# Patient Record
Sex: FEMALE | Race: WHITE | NOT HISPANIC OR LATINO | Employment: OTHER | URBAN - METROPOLITAN AREA
[De-identification: names, ages, dates, MRNs, and addresses within clinical notes are randomized per-mention and may not be internally consistent; named-entity substitution may affect disease eponyms.]

---

## 2017-04-21 ENCOUNTER — RECORDS - HEALTHEAST (OUTPATIENT)
Dept: LAB | Facility: CLINIC | Age: 67
End: 2017-04-21

## 2017-04-21 LAB
AST SERPL W P-5'-P-CCNC: 20 U/L (ref 0–40)
CHOLEST SERPL-MCNC: 195 MG/DL
FASTING STATUS PATIENT QL REPORTED: YES
HDLC SERPL-MCNC: 45 MG/DL
LDLC SERPL CALC-MCNC: 124 MG/DL
TRIGL SERPL-MCNC: 128 MG/DL

## 2017-10-20 ENCOUNTER — HOSPITAL ENCOUNTER (OUTPATIENT)
Dept: MAMMOGRAPHY | Facility: CLINIC | Age: 67
Discharge: HOME | End: 2017-10-20
Attending: FAMILY MEDICINE

## 2017-10-20 DIAGNOSIS — Z12.31 VISIT FOR SCREENING MAMMOGRAM: ICD-10-CM

## 2017-12-18 ENCOUNTER — RECORDS - HEALTHEAST (OUTPATIENT)
Dept: ADMINISTRATIVE | Facility: OTHER | Age: 67
End: 2017-12-18

## 2017-12-22 ENCOUNTER — HOSPITAL ENCOUNTER (OUTPATIENT)
Dept: ULTRASOUND IMAGING | Facility: CLINIC | Age: 67
Discharge: HOME | End: 2017-12-22
Attending: PHYSICIAN ASSISTANT

## 2017-12-22 ENCOUNTER — COMMUNICATION - HEALTHEAST (OUTPATIENT)
Dept: TELEHEALTH | Facility: CLINIC | Age: 67
End: 2017-12-22

## 2017-12-22 ENCOUNTER — HOSPITAL ENCOUNTER (OUTPATIENT)
Dept: MAMMOGRAPHY | Facility: CLINIC | Age: 67
Discharge: HOME | End: 2017-12-22
Attending: PHYSICIAN ASSISTANT

## 2017-12-22 DIAGNOSIS — N63.21 BREAST LUMP ON LEFT SIDE AT 1 O'CLOCK POSITION: ICD-10-CM

## 2017-12-22 DIAGNOSIS — N63.20 LEFT BREAST MASS: ICD-10-CM

## 2017-12-27 LAB
LAB AP CHARGES (HE HISTORICAL CONVERSION): ABNORMAL
LAB MED GENERAL PATH INTERP (HE HISTORICAL CONVERSION): ABNORMAL
PATH REPORT.COMMENTS IMP SPEC: ABNORMAL
PATH REPORT.COMMENTS IMP SPEC: ABNORMAL
PATH REPORT.FINAL DX SPEC: ABNORMAL
PATH REPORT.MICROSCOPIC SPEC OTHER STN: ABNORMAL
PATH REPORT.RELEVANT HX SPEC: ABNORMAL
RESULT FLAG (HE HISTORICAL CONVERSION): ABNORMAL
SPECIMEN DESCRIPTION: ABNORMAL

## 2017-12-28 LAB
LAB AP CHARGES (HE HISTORICAL CONVERSION): NORMAL
PATH REPORT.COMMENTS IMP SPEC: NORMAL
PATH REPORT.COMMENTS IMP SPEC: NORMAL
PATH REPORT.FINAL DX SPEC: NORMAL
PATH REPORT.GROSS SPEC: NORMAL
PATH REPORT.MICROSCOPIC SPEC OTHER STN: NORMAL
PATH REPORT.RELEVANT HX SPEC: NORMAL
RESULT FLAG (HE HISTORICAL CONVERSION): NORMAL

## 2018-01-04 ENCOUNTER — OFFICE VISIT - HEALTHEAST (OUTPATIENT)
Dept: SURGERY | Facility: CLINIC | Age: 68
End: 2018-01-04

## 2018-01-04 DIAGNOSIS — N63.0 BREAST MASS: ICD-10-CM

## 2018-01-04 RX ORDER — SIMVASTATIN 10 MG
1 TABLET ORAL DAILY
Refills: 3 | Status: SHIPPED | COMMUNITY
Start: 2017-12-04

## 2018-01-04 ASSESSMENT — MIFFLIN-ST. JEOR: SCORE: 1095.14

## 2018-01-25 ASSESSMENT — MIFFLIN-ST. JEOR: SCORE: 1095.14

## 2018-01-26 ENCOUNTER — SURGERY - HEALTHEAST (OUTPATIENT)
Dept: SURGERY | Facility: AMBULATORY SURGERY CENTER | Age: 68
End: 2018-01-26

## 2018-01-26 ENCOUNTER — ANESTHESIA - HEALTHEAST (OUTPATIENT)
Dept: SURGERY | Facility: AMBULATORY SURGERY CENTER | Age: 68
End: 2018-01-26

## 2018-01-26 ASSESSMENT — MIFFLIN-ST. JEOR: SCORE: 1095.14

## 2018-01-31 ENCOUNTER — COMMUNICATION - HEALTHEAST (OUTPATIENT)
Dept: SURGERY | Facility: CLINIC | Age: 68
End: 2018-01-31

## 2018-02-02 ENCOUNTER — COMMUNICATION - HEALTHEAST (OUTPATIENT)
Dept: ONCOLOGY | Facility: CLINIC | Age: 68
End: 2018-02-02

## 2018-02-08 ENCOUNTER — AMBULATORY - HEALTHEAST (OUTPATIENT)
Dept: SURGERY | Facility: CLINIC | Age: 68
End: 2018-02-08

## 2018-02-08 DIAGNOSIS — R92.8 ABNORMAL MAMMOGRAM: ICD-10-CM

## 2018-02-09 ENCOUNTER — OFFICE VISIT - HEALTHEAST (OUTPATIENT)
Dept: SURGERY | Facility: CLINIC | Age: 68
End: 2018-02-09

## 2018-02-09 DIAGNOSIS — Z17.0 MALIGNANT NEOPLASM OF LOWER-INNER QUADRANT OF LEFT BREAST IN FEMALE, ESTROGEN RECEPTOR POSITIVE (H): ICD-10-CM

## 2018-02-09 DIAGNOSIS — C50.312 MALIGNANT NEOPLASM OF LOWER-INNER QUADRANT OF LEFT BREAST IN FEMALE, ESTROGEN RECEPTOR POSITIVE (H): ICD-10-CM

## 2018-02-09 ASSESSMENT — MIFFLIN-ST. JEOR: SCORE: 1090.49

## 2018-02-19 ENCOUNTER — ANESTHESIA - HEALTHEAST (OUTPATIENT)
Dept: SURGERY | Facility: AMBULATORY SURGERY CENTER | Age: 68
End: 2018-02-19

## 2018-02-21 ENCOUNTER — HOSPITAL ENCOUNTER (OUTPATIENT)
Dept: MAMMOGRAPHY | Facility: HOSPITAL | Age: 68
Discharge: HOME | End: 2018-02-21
Attending: SPECIALIST

## 2018-02-21 ENCOUNTER — HOSPITAL ENCOUNTER (OUTPATIENT)
Dept: NUCLEAR MEDICINE | Facility: HOSPITAL | Age: 68
Discharge: HOME | End: 2018-02-21
Attending: SPECIALIST | Admitting: RADIOLOGY

## 2018-02-21 ENCOUNTER — SURGERY - HEALTHEAST (OUTPATIENT)
Dept: SURGERY | Facility: AMBULATORY SURGERY CENTER | Age: 68
End: 2018-02-21

## 2018-02-21 DIAGNOSIS — Z17.0 MALIGNANT NEOPLASM OF LOWER-INNER QUADRANT OF LEFT BREAST IN FEMALE, ESTROGEN RECEPTOR POSITIVE (H): ICD-10-CM

## 2018-02-21 DIAGNOSIS — C50.312 MALIGNANT NEOPLASM OF LOWER-INNER QUADRANT OF LEFT BREAST IN FEMALE, ESTROGEN RECEPTOR POSITIVE (H): ICD-10-CM

## 2018-02-26 ENCOUNTER — AMBULATORY - HEALTHEAST (OUTPATIENT)
Dept: SURGERY | Facility: CLINIC | Age: 68
End: 2018-02-26

## 2018-03-01 ENCOUNTER — COMMUNICATION - HEALTHEAST (OUTPATIENT)
Dept: ONCOLOGY | Facility: CLINIC | Age: 68
End: 2018-03-01

## 2018-03-01 ENCOUNTER — OFFICE VISIT - HEALTHEAST (OUTPATIENT)
Dept: SURGERY | Facility: CLINIC | Age: 68
End: 2018-03-01

## 2018-03-01 DIAGNOSIS — Z48.89 POSTOPERATIVE VISIT: ICD-10-CM

## 2018-03-01 DIAGNOSIS — Z17.0 MALIGNANT NEOPLASM OF LOWER-INNER QUADRANT OF LEFT BREAST IN FEMALE, ESTROGEN RECEPTOR POSITIVE (H): ICD-10-CM

## 2018-03-01 DIAGNOSIS — C50.312 MALIGNANT NEOPLASM OF LOWER-INNER QUADRANT OF LEFT BREAST IN FEMALE, ESTROGEN RECEPTOR POSITIVE (H): ICD-10-CM

## 2018-03-02 ENCOUNTER — COMMUNICATION - HEALTHEAST (OUTPATIENT)
Dept: ONCOLOGY | Facility: CLINIC | Age: 68
End: 2018-03-02

## 2018-03-16 ENCOUNTER — OFFICE VISIT - HEALTHEAST (OUTPATIENT)
Dept: SURGERY | Facility: CLINIC | Age: 68
End: 2018-03-16

## 2018-03-16 DIAGNOSIS — Z48.89 POSTOPERATIVE VISIT: ICD-10-CM

## 2018-03-20 ENCOUNTER — COMMUNICATION - HEALTHEAST (OUTPATIENT)
Dept: ONCOLOGY | Facility: CLINIC | Age: 68
End: 2018-03-20

## 2018-03-21 ENCOUNTER — RECORDS - HEALTHEAST (OUTPATIENT)
Dept: ADMINISTRATIVE | Facility: OTHER | Age: 68
End: 2018-03-21

## 2018-03-23 ENCOUNTER — AMBULATORY - HEALTHEAST (OUTPATIENT)
Dept: RADIATION ONCOLOGY | Facility: HOSPITAL | Age: 68
End: 2018-03-23

## 2018-03-23 ENCOUNTER — OFFICE VISIT - HEALTHEAST (OUTPATIENT)
Dept: RADIATION ONCOLOGY | Facility: HOSPITAL | Age: 68
End: 2018-03-23

## 2018-03-23 DIAGNOSIS — Z17.0 MALIGNANT NEOPLASM OF LOWER-INNER QUADRANT OF LEFT BREAST IN FEMALE, ESTROGEN RECEPTOR POSITIVE (H): ICD-10-CM

## 2018-03-23 DIAGNOSIS — C50.312 MALIGNANT NEOPLASM OF LOWER-INNER QUADRANT OF LEFT BREAST IN FEMALE, ESTROGEN RECEPTOR POSITIVE (H): ICD-10-CM

## 2018-03-23 ASSESSMENT — MIFFLIN-ST. JEOR: SCORE: 1096.84

## 2018-03-28 ENCOUNTER — RECORDS - HEALTHEAST (OUTPATIENT)
Dept: ADMINISTRATIVE | Facility: OTHER | Age: 68
End: 2018-03-28

## 2018-04-04 ENCOUNTER — OFFICE VISIT - HEALTHEAST (OUTPATIENT)
Dept: RADIATION ONCOLOGY | Facility: CLINIC | Age: 68
End: 2018-04-04

## 2018-04-04 DIAGNOSIS — C50.312 MALIGNANT NEOPLASM OF LOWER-INNER QUADRANT OF LEFT BREAST IN FEMALE, ESTROGEN RECEPTOR POSITIVE (H): ICD-10-CM

## 2018-04-04 DIAGNOSIS — Z17.0 MALIGNANT NEOPLASM OF LOWER-INNER QUADRANT OF LEFT BREAST IN FEMALE, ESTROGEN RECEPTOR POSITIVE (H): ICD-10-CM

## 2018-04-11 ENCOUNTER — OFFICE VISIT - HEALTHEAST (OUTPATIENT)
Dept: RADIATION ONCOLOGY | Facility: CLINIC | Age: 68
End: 2018-04-11

## 2018-04-11 DIAGNOSIS — Z17.0 MALIGNANT NEOPLASM OF LOWER-INNER QUADRANT OF LEFT BREAST IN FEMALE, ESTROGEN RECEPTOR POSITIVE (H): ICD-10-CM

## 2018-04-11 DIAGNOSIS — C50.312 MALIGNANT NEOPLASM OF LOWER-INNER QUADRANT OF LEFT BREAST IN FEMALE, ESTROGEN RECEPTOR POSITIVE (H): ICD-10-CM

## 2018-04-11 ASSESSMENT — MIFFLIN-ST. JEOR: SCORE: 1103.19

## 2018-04-18 ENCOUNTER — AMBULATORY - HEALTHEAST (OUTPATIENT)
Dept: ONCOLOGY | Facility: CLINIC | Age: 68
End: 2018-04-18

## 2018-04-18 ENCOUNTER — OFFICE VISIT - HEALTHEAST (OUTPATIENT)
Dept: RADIATION ONCOLOGY | Facility: CLINIC | Age: 68
End: 2018-04-18

## 2018-04-18 DIAGNOSIS — C50.312 MALIGNANT NEOPLASM OF LOWER-INNER QUADRANT OF LEFT BREAST IN FEMALE, ESTROGEN RECEPTOR POSITIVE (H): ICD-10-CM

## 2018-04-18 DIAGNOSIS — Z17.0 MALIGNANT NEOPLASM OF LOWER-INNER QUADRANT OF LEFT BREAST IN FEMALE, ESTROGEN RECEPTOR POSITIVE (H): ICD-10-CM

## 2018-04-23 ENCOUNTER — RECORDS - HEALTHEAST (OUTPATIENT)
Dept: LAB | Facility: CLINIC | Age: 68
End: 2018-04-23

## 2018-04-23 LAB
ALBUMIN SERPL-MCNC: 3.9 G/DL (ref 3.5–5)
ALP SERPL-CCNC: 105 U/L (ref 45–120)
ALT SERPL W P-5'-P-CCNC: 24 U/L (ref 0–45)
ANION GAP SERPL CALCULATED.3IONS-SCNC: 12 MMOL/L (ref 5–18)
AST SERPL W P-5'-P-CCNC: 18 U/L (ref 0–40)
BILIRUB SERPL-MCNC: 1.6 MG/DL (ref 0–1)
BUN SERPL-MCNC: 15 MG/DL (ref 8–22)
CALCIUM SERPL-MCNC: 9.3 MG/DL (ref 8.5–10.5)
CHLORIDE BLD-SCNC: 106 MMOL/L (ref 98–107)
CHOLEST SERPL-MCNC: 172 MG/DL
CO2 SERPL-SCNC: 23 MMOL/L (ref 22–31)
CREAT SERPL-MCNC: 0.71 MG/DL (ref 0.6–1.1)
FASTING STATUS PATIENT QL REPORTED: ABNORMAL
GFR SERPL CREATININE-BSD FRML MDRD: >60 ML/MIN/1.73M2
GLUCOSE BLD-MCNC: 107 MG/DL (ref 70–125)
HDLC SERPL-MCNC: 49 MG/DL
LDLC SERPL CALC-MCNC: 91 MG/DL
POTASSIUM BLD-SCNC: 3.8 MMOL/L (ref 3.5–5)
PROT SERPL-MCNC: 7.2 G/DL (ref 6–8)
SODIUM SERPL-SCNC: 141 MMOL/L (ref 136–145)
TRIGL SERPL-MCNC: 159 MG/DL

## 2018-04-25 ENCOUNTER — AMBULATORY - HEALTHEAST (OUTPATIENT)
Dept: ONCOLOGY | Facility: CLINIC | Age: 68
End: 2018-04-25

## 2018-04-25 ENCOUNTER — OFFICE VISIT - HEALTHEAST (OUTPATIENT)
Dept: RADIATION ONCOLOGY | Facility: CLINIC | Age: 68
End: 2018-04-25

## 2018-04-25 DIAGNOSIS — Z17.0 MALIGNANT NEOPLASM OF LOWER-INNER QUADRANT OF LEFT BREAST IN FEMALE, ESTROGEN RECEPTOR POSITIVE (H): ICD-10-CM

## 2018-04-25 DIAGNOSIS — C50.312 MALIGNANT NEOPLASM OF LOWER-INNER QUADRANT OF LEFT BREAST IN FEMALE, ESTROGEN RECEPTOR POSITIVE (H): ICD-10-CM

## 2018-05-08 ENCOUNTER — COMMUNICATION - HEALTHEAST (OUTPATIENT)
Dept: RADIATION ONCOLOGY | Facility: CLINIC | Age: 68
End: 2018-05-08

## 2018-05-09 ENCOUNTER — AMBULATORY - HEALTHEAST (OUTPATIENT)
Dept: RADIATION ONCOLOGY | Facility: CLINIC | Age: 68
End: 2018-05-09

## 2018-06-11 ENCOUNTER — OFFICE VISIT - HEALTHEAST (OUTPATIENT)
Dept: RADIATION ONCOLOGY | Facility: HOSPITAL | Age: 68
End: 2018-06-11

## 2018-06-11 DIAGNOSIS — C50.312 MALIGNANT NEOPLASM OF LOWER-INNER QUADRANT OF LEFT BREAST IN FEMALE, ESTROGEN RECEPTOR POSITIVE (H): ICD-10-CM

## 2018-06-11 DIAGNOSIS — Z17.0 MALIGNANT NEOPLASM OF LOWER-INNER QUADRANT OF LEFT BREAST IN FEMALE, ESTROGEN RECEPTOR POSITIVE (H): ICD-10-CM

## 2018-06-18 ENCOUNTER — COMMUNICATION - HEALTHEAST (OUTPATIENT)
Dept: ONCOLOGY | Facility: HOSPITAL | Age: 68
End: 2018-06-18

## 2018-06-20 ENCOUNTER — COMMUNICATION - HEALTHEAST (OUTPATIENT)
Dept: ONCOLOGY | Facility: CLINIC | Age: 68
End: 2018-06-20

## 2018-07-23 ENCOUNTER — RECORDS - HEALTHEAST (OUTPATIENT)
Dept: LAB | Facility: CLINIC | Age: 68
End: 2018-07-23

## 2018-07-23 LAB
CHOLEST SERPL-MCNC: 163 MG/DL
FASTING STATUS PATIENT QL REPORTED: YES
HDLC SERPL-MCNC: 50 MG/DL
LDLC SERPL CALC-MCNC: 94 MG/DL
TRIGL SERPL-MCNC: 97 MG/DL

## 2018-10-22 ENCOUNTER — COMMUNICATION - HEALTHEAST (OUTPATIENT)
Dept: TELEHEALTH | Facility: CLINIC | Age: 68
End: 2018-10-22

## 2018-10-22 ENCOUNTER — HOSPITAL ENCOUNTER (OUTPATIENT)
Dept: MAMMOGRAPHY | Facility: CLINIC | Age: 68
Discharge: HOME | End: 2018-10-22
Attending: SPECIALIST

## 2018-10-22 DIAGNOSIS — Z17.0 MALIGNANT NEOPLASM OF LOWER-INNER QUADRANT OF LEFT BREAST IN FEMALE, ESTROGEN RECEPTOR POSITIVE (H): ICD-10-CM

## 2018-10-22 DIAGNOSIS — C50.312 MALIGNANT NEOPLASM OF LOWER-INNER QUADRANT OF LEFT BREAST IN FEMALE, ESTROGEN RECEPTOR POSITIVE (H): ICD-10-CM

## 2018-11-01 ENCOUNTER — HOSPITAL ENCOUNTER (OUTPATIENT)
Dept: SURGERY | Facility: CLINIC | Age: 68
Discharge: HOME | End: 2018-11-01
Attending: SPECIALIST

## 2018-11-01 DIAGNOSIS — Z85.3 PERSONAL HISTORY OF BREAST CANCER: ICD-10-CM

## 2018-11-01 ASSESSMENT — MIFFLIN-ST. JEOR: SCORE: 1047.52

## 2019-04-16 ENCOUNTER — RECORDS - HEALTHEAST (OUTPATIENT)
Dept: LAB | Facility: CLINIC | Age: 69
End: 2019-04-16

## 2019-04-16 LAB
CHOLEST SERPL-MCNC: 140 MG/DL
FASTING STATUS PATIENT QL REPORTED: YES
HDLC SERPL-MCNC: 46 MG/DL
LDLC SERPL CALC-MCNC: 74 MG/DL
TRIGL SERPL-MCNC: 102 MG/DL

## 2019-10-31 ENCOUNTER — HOSPITAL ENCOUNTER (OUTPATIENT)
Dept: MAMMOGRAPHY | Facility: CLINIC | Age: 69
Discharge: HOME | End: 2019-10-31
Attending: PHYSICIAN ASSISTANT

## 2019-10-31 DIAGNOSIS — Z12.31 VISIT FOR SCREENING MAMMOGRAM: ICD-10-CM

## 2019-11-05 ENCOUNTER — HOSPITAL ENCOUNTER (OUTPATIENT)
Dept: SURGERY | Facility: CLINIC | Age: 69
Discharge: HOME | End: 2019-11-05
Attending: SPECIALIST

## 2019-11-05 DIAGNOSIS — Z85.3 PERSONAL HISTORY OF BREAST CANCER: ICD-10-CM

## 2019-11-05 ASSESSMENT — MIFFLIN-ST. JEOR: SCORE: 1058.86

## 2020-04-21 ENCOUNTER — RECORDS - HEALTHEAST (OUTPATIENT)
Dept: LAB | Facility: CLINIC | Age: 70
End: 2020-04-21

## 2020-04-21 LAB
ALBUMIN SERPL-MCNC: 4.2 G/DL (ref 3.5–5)
ALP SERPL-CCNC: 110 U/L (ref 45–120)
ALT SERPL W P-5'-P-CCNC: 18 U/L (ref 0–45)
ANION GAP SERPL CALCULATED.3IONS-SCNC: 11 MMOL/L (ref 5–18)
AST SERPL W P-5'-P-CCNC: 15 U/L (ref 0–40)
BILIRUB SERPL-MCNC: 1.4 MG/DL (ref 0–1)
BUN SERPL-MCNC: 15 MG/DL (ref 8–22)
CALCIUM SERPL-MCNC: 9.3 MG/DL (ref 8.5–10.5)
CHLORIDE BLD-SCNC: 105 MMOL/L (ref 98–107)
CHOLEST SERPL-MCNC: 174 MG/DL
CO2 SERPL-SCNC: 25 MMOL/L (ref 22–31)
CREAT SERPL-MCNC: 0.75 MG/DL (ref 0.6–1.1)
FASTING STATUS PATIENT QL REPORTED: YES
GFR SERPL CREATININE-BSD FRML MDRD: >60 ML/MIN/1.73M2
GLUCOSE BLD-MCNC: 99 MG/DL (ref 70–125)
HDLC SERPL-MCNC: 48 MG/DL
LDLC SERPL CALC-MCNC: 96 MG/DL
POTASSIUM BLD-SCNC: 3.9 MMOL/L (ref 3.5–5)
PROT SERPL-MCNC: 7.3 G/DL (ref 6–8)
SODIUM SERPL-SCNC: 141 MMOL/L (ref 136–145)
TRIGL SERPL-MCNC: 148 MG/DL

## 2020-11-02 ENCOUNTER — HOSPITAL ENCOUNTER (OUTPATIENT)
Dept: MAMMOGRAPHY | Facility: CLINIC | Age: 70
Discharge: HOME | End: 2020-11-02

## 2020-11-02 DIAGNOSIS — Z12.31 VISIT FOR SCREENING MAMMOGRAM: ICD-10-CM

## 2020-11-03 ENCOUNTER — RECORDS - HEALTHEAST (OUTPATIENT)
Dept: ADMINISTRATIVE | Facility: OTHER | Age: 70
End: 2020-11-03

## 2020-11-04 ENCOUNTER — RECORDS - HEALTHEAST (OUTPATIENT)
Dept: ADMINISTRATIVE | Facility: OTHER | Age: 70
End: 2020-11-04

## 2020-11-06 ENCOUNTER — HOSPITAL ENCOUNTER (OUTPATIENT)
Dept: MAMMOGRAPHY | Facility: CLINIC | Age: 70
Discharge: HOME | End: 2020-11-06

## 2020-11-06 DIAGNOSIS — R92.0 BREAST MICROCALCIFICATIONS: ICD-10-CM

## 2021-01-06 ENCOUNTER — RECORDS - HEALTHEAST (OUTPATIENT)
Dept: ADMINISTRATIVE | Facility: OTHER | Age: 71
End: 2021-01-06

## 2021-04-26 ENCOUNTER — RECORDS - HEALTHEAST (OUTPATIENT)
Dept: LAB | Facility: CLINIC | Age: 71
End: 2021-04-26

## 2021-04-26 LAB
ALBUMIN SERPL-MCNC: 4.1 G/DL (ref 3.5–5)
ALP SERPL-CCNC: 112 U/L (ref 45–120)
ALT SERPL W P-5'-P-CCNC: 11 U/L (ref 0–45)
ANION GAP SERPL CALCULATED.3IONS-SCNC: 14 MMOL/L (ref 5–18)
AST SERPL W P-5'-P-CCNC: 15 U/L (ref 0–40)
BILIRUB SERPL-MCNC: 0.8 MG/DL (ref 0–1)
BUN SERPL-MCNC: 15 MG/DL (ref 8–22)
CALCIUM SERPL-MCNC: 9 MG/DL (ref 8.5–10.5)
CHLORIDE BLD-SCNC: 105 MMOL/L (ref 98–107)
CHOLEST SERPL-MCNC: 214 MG/DL
CO2 SERPL-SCNC: 24 MMOL/L (ref 22–31)
CREAT SERPL-MCNC: 0.72 MG/DL (ref 0.6–1.1)
FASTING STATUS PATIENT QL REPORTED: ABNORMAL
GFR SERPL CREATININE-BSD FRML MDRD: >60 ML/MIN/1.73M2
GLUCOSE BLD-MCNC: 97 MG/DL (ref 70–125)
HDLC SERPL-MCNC: 38 MG/DL
LDLC SERPL CALC-MCNC: 145 MG/DL
POTASSIUM BLD-SCNC: 3.4 MMOL/L (ref 3.5–5)
PROT SERPL-MCNC: 7.1 G/DL (ref 6–8)
SODIUM SERPL-SCNC: 143 MMOL/L (ref 136–145)
TRIGL SERPL-MCNC: 157 MG/DL

## 2021-05-26 ENCOUNTER — RECORDS - HEALTHEAST (OUTPATIENT)
Dept: ADMINISTRATIVE | Facility: CLINIC | Age: 71
End: 2021-05-26

## 2021-05-27 ENCOUNTER — RECORDS - HEALTHEAST (OUTPATIENT)
Dept: ADMINISTRATIVE | Facility: CLINIC | Age: 71
End: 2021-05-27

## 2021-05-28 ENCOUNTER — RECORDS - HEALTHEAST (OUTPATIENT)
Dept: ADMINISTRATIVE | Facility: CLINIC | Age: 71
End: 2021-05-28

## 2021-05-29 ENCOUNTER — RECORDS - HEALTHEAST (OUTPATIENT)
Dept: ADMINISTRATIVE | Facility: CLINIC | Age: 71
End: 2021-05-29

## 2021-05-30 ENCOUNTER — RECORDS - HEALTHEAST (OUTPATIENT)
Dept: ADMINISTRATIVE | Facility: CLINIC | Age: 71
End: 2021-05-30

## 2021-05-31 VITALS — WEIGHT: 143 LBS | BODY MASS INDEX: 28.07 KG/M2 | HEIGHT: 60 IN

## 2021-06-01 VITALS — HEIGHT: 59 IN | WEIGHT: 147.4 LBS | BODY MASS INDEX: 29.72 KG/M2

## 2021-06-01 VITALS — WEIGHT: 144.6 LBS | BODY MASS INDEX: 29.15 KG/M2 | HEIGHT: 59 IN

## 2021-06-01 VITALS — BODY MASS INDEX: 29.43 KG/M2 | WEIGHT: 146 LBS | HEIGHT: 59 IN

## 2021-06-01 VITALS — WEIGHT: 146.9 LBS | BODY MASS INDEX: 29.42 KG/M2

## 2021-06-01 VITALS — BODY MASS INDEX: 27.24 KG/M2 | WEIGHT: 136 LBS

## 2021-06-01 VITALS — WEIGHT: 141 LBS | BODY MASS INDEX: 28.24 KG/M2

## 2021-06-01 VITALS — BODY MASS INDEX: 29.14 KG/M2 | WEIGHT: 145.5 LBS

## 2021-06-01 VITALS — BODY MASS INDEX: 29.38 KG/M2 | WEIGHT: 146.7 LBS

## 2021-06-02 VITALS — WEIGHT: 136 LBS | HEIGHT: 59 IN | BODY MASS INDEX: 27.42 KG/M2

## 2021-06-03 VITALS — HEIGHT: 60 IN | WEIGHT: 135 LBS | BODY MASS INDEX: 26.5 KG/M2

## 2021-06-03 NOTE — PROGRESS NOTES
This is a 69 y.o. woman who comes in for  continued follow-up of her left breast cancer.  She is now nearly 2 years  status post left partial mastectomy with radiation.  She is feeling well.  She has no new complaints today.      Please see the chart review for PMH, Meds, allergies, FH and SH.    ROS:  A 12 point comprehensive review of systems was negative except as noted.      Physical Exam:  /90   Pulse 84   Resp 16   Ht 5' (1.524 m)   Wt 135 lb (61.2 kg)   LMP 10/20/1996   SpO2 98%   BMI 26.37 kg/m      General appearance: alert, appears stated age and cooperative  Breasts: There are no palpable masses. There are minimal radiation changes. The scar has healed up well.  Lymph nodes: Cervical, supraclavicular, and axillary nodes normal.  Neurologic: Grossly normal    Data Review: Reviewed her current mammograms. No evidence of disease.      Impression: Personal History of breast cancer. NOD.  Is overall doing very well.  Discussed with the patient that follow-up with myself can now be as needed.  She will be continuing with yearly mammograms.  She is not seeing an oncologist that she did not want to do hormone therapy.  I did tell her I be happy to see her on a yearly basis but if she is seeing her OB/GYN who does a good breast exam that might be okay with that as her follow-up.    Plan: Follow up as needed.  Continue with yearly mammograms and continue with self-exam.

## 2021-06-05 ENCOUNTER — RECORDS - HEALTHEAST (OUTPATIENT)
Dept: MAMMOGRAPHY | Facility: CLINIC | Age: 71
End: 2021-06-05

## 2021-06-05 DIAGNOSIS — R92.8 OTHER ABNORMAL FINDINGS ON RADIOLOGICAL EXAMINATION OF BREAST: ICD-10-CM

## 2021-06-14 ENCOUNTER — HOSPITAL ENCOUNTER (OUTPATIENT)
Dept: MAMMOGRAPHY | Facility: CLINIC | Age: 71
Discharge: HOME | End: 2021-06-14
Attending: FAMILY MEDICINE

## 2021-06-14 DIAGNOSIS — R92.1 BREAST CALCIFICATION, RIGHT: ICD-10-CM

## 2021-06-14 DIAGNOSIS — Z09 FOLLOW UP: ICD-10-CM

## 2021-06-15 ENCOUNTER — HOSPITAL ENCOUNTER (OUTPATIENT)
Dept: MAMMOGRAPHY | Facility: CLINIC | Age: 71
Discharge: HOME | End: 2021-06-15

## 2021-06-15 DIAGNOSIS — R92.0 BREAST MICROCALCIFICATIONS: ICD-10-CM

## 2021-06-15 NOTE — ANESTHESIA PREPROCEDURE EVALUATION
Anesthesia Evaluation      Patient summary reviewed   History of anesthetic complications (ponv)     Airway   Mallampati: III  Neck ROM: full   Pulmonary - normal exam   (+) a smoker (remote)                         Cardiovascular - normal exam  Exercise tolerance: > or = 4 METS  (+) , hypercholesterolemia,      Neuro/Psych - negative ROS     Endo/Other    (+) arthritis,      GI/Hepatic/Renal            Dental - normal exam   (+) caps                       Anesthesia Plan  Planned anesthetic: MAC  Versed/fent/prop - ketamine PRN  GA PRN  Fi02 less than 30%    Decadron/zofran  ASA 2   Induction: intravenous   Anesthetic plan and risks discussed with: patient and spouse  Anesthesia plan special considerations: antiemetics,   Post-op plan: routine recovery        Chemistry        Component Value Date/Time     11/07/2017 0827    K 4.0 11/07/2017 0827     11/07/2017 0827    CO2 24 11/07/2017 0827    BUN 13 11/07/2017 0827    CREATININE 0.66 11/07/2017 0827     11/07/2017 0827        Component Value Date/Time    CALCIUM 9.2 11/07/2017 0827    ALKPHOS 106 11/07/2017 0827    AST 21 11/07/2017 0827    ALT 29 11/07/2017 0827    BILITOT 1.1 (H) 11/07/2017 0827

## 2021-06-15 NOTE — ANESTHESIA POSTPROCEDURE EVALUATION
Patient: Ashli Garcia  Left Breast Biopsy  Anesthesia type: MAC    Patient location: Phase II Recovery  Last vitals:   Vitals:    01/26/18 1240   BP: 115/65   Pulse:    Resp: 18   Temp:    SpO2:      Post vital signs: stable  Level of consciousness: awake and responds to simple questions  Post-anesthesia pain: pain controlled  Post-anesthesia nausea and vomiting: no  Pulmonary: unassisted, return to baseline  Cardiovascular: stable and blood pressure at baseline  Hydration: adequate  Anesthetic events: no    QCDR Measures:  ASA# 11 - Bouchra-op Cardiac Arrest: ASA11B - Patient did NOT experience unanticipated cardiac arrest  ASA# 12 - Bouchra-op Mortality Rate: ASA12B - Patient did NOT die  ASA# 13 - PACU Re-Intubation Rate: ASA13B - Patient did NOT require a new airway mgmt  ASA# 10 - Composite Anes Safety: ASA10A - No serious adverse event    Additional Notes:

## 2021-06-15 NOTE — PROGRESS NOTES
This is a 67 y.o. woman who I'm asked to see by Ivanna Adames PA-C for evaluation of  left breast lesion.  This was found by the patient.  She just recently had screening mammograms that were reportedly normal.  In retrospect a density is seen but it correlates with a known cyst that she has had there for some time.  She had a diagnostic ultrasound done.  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  She has no family history of breast cancer.      See chart review for PMH, Med list, allergies, FH and SH.  Past Medical History:   Diagnosis Date     Breast cyst 2015     PONV (postoperative nausea and vomiting)     Nausea       Current Outpatient Prescriptions:      simvastatin (ZOCOR) 10 MG tablet, Take 1 tablet by mouth daily., Disp: , Rfl: 3  Allergies   Allergen Reactions     Penicillins Hives         A 12 point comprehensive review of systems was negative except as noted.    Physical exam:  /86  Pulse 76  Resp 18  Ht 5' (1.524 m)  Wt 143 lb (64.9 kg)  LMP 10/20/1996  SpO2 100%  BMI 27.93 kg/m2   General:alert, appears stated age and cooperative   Lungs: Clear  CV: Regular  Breast: Clearly palpable mass in the inferior aspect of the left breast.  No other palpable masses.  On ultrasound I can clearly identify the cystic lesion that has a filling defect and part of it.      Imaging: Her mammogram and ultrasound were reviewed.    Impression: Papillary lesion in the right breast.  Further excision is indicated to rule out DCIS or invasive papillary carcinoma. Risks and benefits of surgery explained and they wish to proceed.  All questions answered.    Plan: Right breast biopsy. Typically an outpatient procedure under local MAC anesthetic.

## 2021-06-15 NOTE — ANESTHESIA CARE TRANSFER NOTE
Last vitals:   Vitals:    01/26/18 1153   BP: (P) 113/55   Pulse: (P) 85   Resp: (P) 16   Temp: (P) 36.4  C (97.6  F)   SpO2: (P) 100%     Patient's level of consciousness is awake  Spontaneous respirations: yes  Maintains airway independently: yes  Dentition unchanged: yes  Oropharynx: oropharynx clear of all foreign objects    QCDR Measures:  ASA# 20 - Surgical Safety Checklist: WHO surgical safety checklist completed prior to induction  PQRS# 430 - Adult PONV Prevention: 4558F - Pt received => 2 anti-emetic agents (different classes) preop & intraop  ASA# 8 - Peds PONV Prevention: NA - Not pediatric patient, not GA or 2 or more risk factors NOT present  PQRS# 424 - Bouchra-op Temp Management: NA - MAC anesthesia or case < 60 minutes  PQRS# 426 - PACU Transfer Protocol: - Transfer of care checklist used  ASA# 14 - Acute Post-op Pain: ASA14B - Patient did NOT experience pain >= 7 out of 10

## 2021-06-16 ENCOUNTER — COMMUNICATION - HEALTHEAST (OUTPATIENT)
Dept: MAMMOGRAPHY | Facility: CLINIC | Age: 71
End: 2021-06-16

## 2021-06-16 PROBLEM — D12.6 BENIGN NEOPLASM OF COLON: Status: ACTIVE | Noted: 2018-10-31

## 2021-06-16 PROBLEM — E78.2 MIXED HYPERLIPIDEMIA: Status: ACTIVE | Noted: 2018-10-31

## 2021-06-16 PROBLEM — C50.919 MALIGNANT NEOPLASM OF FEMALE BREAST (H): Status: ACTIVE | Noted: 2018-02-13

## 2021-06-16 PROBLEM — Z17.0 ESTROGEN RECEPTOR POSITIVE NEOPLASM: Status: ACTIVE | Noted: 2018-10-31

## 2021-06-16 PROBLEM — M85.80 OSTEOPENIA: Status: ACTIVE | Noted: 2018-10-31

## 2021-06-16 PROBLEM — N63.0 BREAST MASS: Status: ACTIVE | Noted: 2018-01-05

## 2021-06-16 PROBLEM — R92.8 ABNORMAL MAMMOGRAM: Status: ACTIVE | Noted: 2018-10-31

## 2021-06-16 PROBLEM — M85.80 LOW BONE MASS: Status: ACTIVE | Noted: 2018-10-31

## 2021-06-16 PROBLEM — K57.92 DIVERTICULITIS: Status: ACTIVE | Noted: 2018-10-31

## 2021-06-16 PROBLEM — D49.9 ESTROGEN RECEPTOR POSITIVE NEOPLASM: Status: ACTIVE | Noted: 2018-10-31

## 2021-06-16 NOTE — PROGRESS NOTES
Ashli comes in today because she is concerned about swelling in her left axilla.  She also states that she has been feeling hot at times and is worried about infection.    Physical exam:  The incisions look great.  There is absolutely no erythema at all.  There is slight fullness in the left axilla but I cannot tell if this is actually a fluid collection versus just scar tissue.  I attempted to aspirate it and could not get any fluid back so this is likely just scar tissue.    Impression: Doing well following her lumpectomy.  She is still complaining and concerned about getting radiation and/or chemotherapy.  I told her it is all her choice about what she does but it is our recommendation that she have chemotherapy and at least do hormone therapy.  She has appointment set up to discuss this with the oncologist and radiation oncologist at [facility].  She will follow-up with me in about October with mammograms.

## 2021-06-16 NOTE — PROGRESS NOTES
[facility] Radiation Oncology Consult Note    Patient: Ashli Garcia  MRN: [id number]  Date of Service: 2018    Assessment / Impression     1. Malignant neoplasm of lower-inner quadrant of left breast in female, estrogen receptor positive       No matching staging information was found for the patient.  ECOG Peformance Status  ECOG Performance Status: 0  Distress Assessment Score: 4 (anticipation of radiation)    Plan:   1. Obtain CT Sim for further radiation planning.    Face to face time  60 minutes with > 75% spent on consultation, education and coordination of care.  Intent of Therapy: Curative  Patient on concurrent Herceptin No  Adjuvant hormonal therapy: Yes Agent: Anastrozole (arimidex)  Start: Following radiation  Chemotherapy: No   Intended fractionation schedule: hypofractionation no boost     Breast cancer risk factors:     LMP Dates from Last 4 Encounters:   LMP: 10/20/1996   LMP: 10/20/1996   LMP: 10/20/1996   LMP: 10/20/1996       We recommend adjuvant radiation as part of her breast conserving therapy to decrease her chance of local recurrence to the single digits. ROM stretches and skin care were discussed with the patient. She understands that these maneuvers need to continue for 6 months following completion of radiation as skin and muscle fibrosis continue to form for weeks to months following completion of therapy.      Side effects that may occur during or within weeks after radiation therapy      Fatigue and general weakness    Darkening, irritation, itchiness, redness, dryness, erythema, peeling, scabbing, ulceration and contraction of the skin of the breast and chest    Swelling of the breast    Loss of armpit hair    Lung irritation    Decrease in appetite    Side effects that may occur months or years after radiation therapy      Development of another tumor or cancer    Thickening, telangiectasias (development of spider like blood vessels in the skin) and ulceration of  the skin of the breast and chest    Firming, fibrosis (scar tissue), fat necrosis, and distortion of the breast    Poor healing after a trauma or surgery in the irradiated area    Nerve damage resulting in loss of arm strength and sensation    Coronary artery blockage causing angina pain or a heart attack    Lung inflammation of fibrosis causing cough, fever and shortness of breath    Fracture of the ribs    Swellingof an arm and hand    The risks, benefits and alternatives to radiation therapy were outlined with the patient. All questions were answered and a consent was signed.      Subjective:      HPI: Ashli Garcia is a 67 y.o. female who presents regarding left breast cancer.     Routine mammogram performed on 9/08/2017 without signs of malignancy however patient began feeling a lump in her left breast shortly afterwards and an US of the left breast on 12/18/2017 revealed a complex cystic lesion at 7:00 position, there was a benign appearing cyst at this location in previous US however lesion was now more complex and indeterminate.     The patient had a left breast needle core biopsy performed on 12/22/2017, diagnosis of papillary carcinoma could not be excluded. She had subsequent left breast biopsy performed on 1/26/2018, pathology revealed invasive mammary carcinoma grade I, papillary carcinoma 2.9 cm,  7 mm and 3 mm (mixed ductal and lobular carcinoma), positive margins, DCIS pos grade 2, ER/NE pos, HER-2 neg.    She had subsequent left breast re-excision lumpectomy and SLN biopsy performed on 2/21/2018, pathology was negative for residual invasive carcinoma with benign oriented surgical margins, pathological stage pN0, SLN 0/1 pos, oncotype 10.     She has consulted with Dr. Bryson at [facility] on 3/21/2018 for further chemotherapy/hormone therapy, plan is for 5 year adjuvant Arimidex therapy following radiation.     Prior Radiation: No  Concurrent Chemotherapy: No    Current Outpatient Prescriptions    Medication Sig Dispense Refill     simvastatin (ZOCOR) 10 MG tablet Take 1 tablet by mouth daily.  3     No current facility-administered medications for this visit.      Past Medical History:   Diagnosis Date     Arthritis      Breast cyst      Malignant neoplasm of lower-inner quadrant of left breast in female, estrogen receptor positive 2018     PONV (postoperative nausea and vomiting)     Nausea     Past Surgical History:   Procedure Laterality Date     APPENDECTOMY       BREAST BIOPSY Left 2018    Procedure: Left Breast Biopsy;  Surgeon: Jaja Sloan MD;  Location: [facility];  Service:      BREAST CYST ASPIRATION Left       SECTION       CHOLECYSTECTOMY       AK MASTECTOMY,PARTIAL,  WITH AXILLARY LYMPHADENECTOMY Left 2018    Procedure: Left Re-excision Lumpectomy; [place] Lymph Node Biopsy;  Surgeon: Jaja Sloan MD;  Location: [facility];  Service: General     Penicillins  Family History   Problem Relation Age of Onset     Pancreatic cancer Mother      No Medical Problems Brother      No Medical Problems Brother      No Medical Problems Brother      Social History     Social History     Marital status:      Spouse name: N/A     Number of children: N/A     Years of education: N/A     Occupational History     Not on file.     Social History Main Topics     Smoking status: Former Smoker     Smokeless tobacco: Never Used      Comment: about 20 yrs ago     Alcohol use Yes      Comment: Occasionally     Drug use: No     Sexual activity: Not on file     Other Topics Concern     Not on file     Social History Narrative        Review of Systems:        General  General (WDL): All general elements are within defined limits  EENT  ENT (WDL): Exceptions to WDL  Glasses or Contacts: Yes - Chronic (Greater than 3 months) (glasses)  Respiratory       Respiratory (WDL): All respiratory elements are within defined limits  Cardiovascular  Cardiovascular (WDL): All  "cardiovascular elements are within defined limits  Endocrine  Endocrine (WDL): All endocrine elements are within defined limits  Gastrointestinal  Gastrointestinal (WDL): All gastrointestinal elements are within defined limits  Musculoskeletal  Musculoskeletal (WDL): All musculoskeletal elements are within defined limits  Integumentary                  Neurological  Neurological (WDL): All neurological elements are within defined limits  Dominant Hand: Right  Psychological/Emotional   Psychological/Emotional (WDL): All psychological/emotional elements are within defined limits  Hematological/Lymphatic  Hematological/Lymphatic (WDL): All hematological/lymphatic elements are within defined limits  Dermatologic  Dermatologic (WDL): Exceptions to WDL  Healing Incision: Yes - Recent (Less than 3 months) (Left Breast)  Genitourinary/Reproductive  Genitourinary/Reproductive (WDL): All genitourinary/reproductive elements are within defined limits  Reproductive     Pain              Currently in Pain: No/denies   AUA Assessment                    Accompanied by         Objective:     Physical Exam    Vitals:    03/23/18 0847   BP: 172/87   Pulse: 70   Temp: 98.2  F (36.8  C)   TempSrc: Oral   SpO2: 99%   Weight: 146 lb (66.2 kg)   Height: 4' 11.25\" (1.505 m)       Physical Exam  Head: Normocephalic, without obvious abnormality, atraumatic  Neck: no adenopathy and supple, symmetrical, trachea midline  Lungs: clear to auscultation bilaterally  Breasts:  expected post operative changes, no masses skin changes suggestive of recurrence or infection.   Heart: regular rate and rhythm  Skin: Skin color, texture, turgor normal. No rashes or lesions  Lymph nodes: Cervical, supraclavicular, and axillary nodes normal.  Extremities: No lymphedema, full ROM UE.   Neurologic: Grossly normal  Pysch: affect normal, thought content appropriate.        Recent Labs: No results found for this or any previous visit (from the past 168 " hour(s)).    Imaging: Imaging results 30 days: No results found.    Pathology:   Results for orders placed or performed during the hospital encounter of 12/22/17 (from the past 8760 hour(s))   Surgical Pathology Exam   Result Value    Case Report      Surgical Pathology                                Case: [id number]                                   Authorizing Provider:  Ivanna Adames PA-C       Collected:           12/22/2017 7219              Ordering Location:     [facility]    Received:            12/22/2017 1450                                     Ultrasound                                                                   Pathologist:           Dalton Lara MD                                                       Specimen:    Breast, Left                                                                               Final Diagnosis      NEEDLE CORE BIOPSIES OF LEFT BREAST MASS:     - ATYPICAL PAPILLARY LESION (SEE COMMENT BELOW)     - PAPILLARY CARCINOMA CANNOT BE ENTIRELY EXCLUDED ON THE BASIS OF BIOPSY        MATERIAL    Comment      A diagnosis of papillary carcinoma cannot be excluded on the basis of the biopsy material. Total excision of the lesion is recommended.    The case is also reviewed by Almas Ballard and Charlotte Esquivel, who agree.     The biopsy correlates with a concurrent needle aspiration of breast ([facility] case [id number]).    Microscopic Description      The specimen consists of an epithelial lesion with a definite papillary growth pattern, demonstrating one or more rows of epithelial cells following the surface contours of finger-like projections of fibrovascular core tissue. Cytologically, the lesion is monomorphic, and shows a moderate degree of nuclear atypia. The lesion is also fragmented within the biopsy material.    Clinical Information      Pre-op Diagnosis: N63.21 - Breast lump on left side at 1 o'clock position [ICD-10-CM] N63.20 - Left breast mass  "[ICD-10-CM]  Time placed in formalin: 2:15 pm    Breast: Left  Site: Not provided   Quadrant: Lower inner quadrant   Number of Cores: 5  Indication: 2.9 cm   Pre-test Suspicion: Intermediate  Microcalcifications on Specimen Radiograph: NA  Radiologist: Jl Pina MD    Gross Description      The specimen is received in formalin, labeled with the patient's name and \"left breast,\" and consists of 12 core-like fragments of soft tan to dark red tissue, measuring from 2 mm to 9 mm in length and from less than 1 mm to 2 mm in diameter. The tissue is totally submitted in 1 cassette, inked blue for identification. JPL:nii Moreno      CPT: 25855  ICD-10: N63.20      CC:   Jl Pina MD    Result Flag      Comment:   SPECIMEN PROCESSING:    All histology slide preparation and stains; and cytology slide preparation, staining, and cytotechnologist screening done at [facility] are performed at [facility], [address] [place] [address], [facility], with final interpretation, frozen section analysis, and cytology adequacy assessment at indicated laboratory.         Pathology Results     Malignant - Core biopsy, Left - 1/26/2018      Pathology Code Malignancy Type    Invasive mammary carcinoma Invasive    Invasive papillary carcinoma Invasive    Ductal carcinoma in situ intermediate nuclear grade Non-Invasive          Additional Information            [place]:  Not Entered Margin Status:  Involved         Histology Grade:  G1          Overall Size:  29 mm     Additional Comments:  Updated with 1/26/18 [facility] report [id number]. [facility]. Dr Jaja Sloan. No nodes <BR>removed. ER, MA, not in report.                 High Risk - Fine needle aspiration cytology, Left - 12/22/2017     Additional Information            [place]:  Not Entered                       I, Constance Gleason MD personally performed the services described in this documentation, as scribed by Colt Hernandez in my presence, and it is both " accurate and complete.    Signed by: Constance Gleason MD, MPH

## 2021-06-16 NOTE — ANESTHESIA POSTPROCEDURE EVALUATION
Patient: Ashli Garcia  Left Re-excision Lumpectomy; [place] Lymph Node Biopsy  Anesthesia type: MAC    Patient location: PACU  Last vitals:   Vitals:    02/21/18 1020   BP:    Pulse: 76   Resp:    Temp:    SpO2: 99%     Post vital signs: stable  Level of consciousness: awake and responds to simple questions  Post-anesthesia pain: pain controlled  Post-anesthesia nausea and vomiting: no  Pulmonary: unassisted, return to baseline  Cardiovascular: stable and blood pressure at baseline  Hydration: adequate  Anesthetic events: no    QCDR Measures:  ASA# 11 - Bouchra-op Cardiac Arrest: ASA11B - Patient did NOT experience unanticipated cardiac arrest  ASA# 12 - Bouchra-op Mortality Rate: ASA12B - Patient did NOT die  ASA# 13 - PACU Re-Intubation Rate: NA - No ETT / LMA used for case  ASA# 10 - Composite Anes Safety: ASA10A - No serious adverse event    Additional Notes:

## 2021-06-16 NOTE — ANESTHESIA CARE TRANSFER NOTE
Last vitals:   Vitals:    02/21/18 0946   BP: 114/57   Pulse: 88   Resp: 16   Temp: 36.4  C (97.6  F)   SpO2: 94%     Patient's level of consciousness is awake  Spontaneous respirations: yes  Maintains airway independently: yes  Dentition unchanged: yes  Oropharynx: oropharynx clear of all foreign objects    QCDR Measures:  ASA# 20 - Surgical Safety Checklist: WHO surgical safety checklist completed prior to induction  PQRS# 430 - Adult PONV Prevention: 4558F - Pt received => 2 anti-emetic agents (different classes) preop & intraop  ASA# 8 - Peds PONV Prevention: NA - Not pediatric patient, not GA or 2 or more risk factors NOT present  PQRS# 424 - Bouchra-op Temp Management: 4559F - At least one body temp DOCUMENTED => 35.5C or 95.9F within required timeframe  PQRS# 426 - PACU Transfer Protocol: - Transfer of care checklist used  ASA# 14 - Acute Post-op Pain: ASA14B - Patient did NOT experience pain >= 7 out of 10.

## 2021-06-16 NOTE — ANESTHESIA PREPROCEDURE EVALUATION
Anesthesia Evaluation      Patient summary reviewed   History of anesthetic complications (ponv)     Airway   Mallampati: III  Neck ROM: full   Pulmonary - normal exam   (+) a smoker (remote)                         Cardiovascular - normal exam  Exercise tolerance: > or = 4 METS  (+) , hypercholesterolemia,      Neuro/Psych - negative ROS     Endo/Other    (+) arthritis,      GI/Hepatic/Renal    (-) GERD          Dental    (+) caps    Comment: Cap front top tooth                       Anesthesia Plan  Planned anesthetic: MAC  FIO2 < 30% for fire risk  Glycopyrrolate 0.2 mg IV  Propofol IV sedation  Ketamine IV PRN  Decadron 10 mg IV  GA PRN  ASA 2     Anesthetic plan and risks discussed with: patient and spouse  Anesthesia plan special considerations: antiemetics,   Post-op plan: routine recovery

## 2021-06-16 NOTE — PROGRESS NOTES
Patient here ambulatory accompanied by  for radiation consult for left breast cancer.  Patient states she has healed well from her surgery and is having no problems with ROM.  Patient saw Dr. Bryson for medical oncology earlier this week and was recommended to have hormone therapy.  Patient states she is not sure she wants to do this.  Patient has concerns about radiation exposure as well and is questioning need for radiation.  20 minutes spent in review of radiation process and potential side effects.  Written information given: ACS RT book, [facility] RT handouts, RT to breast handout, doctor bio card, ROM exercises, team photo sheet and cancer care welcome letter.  Seen by Dr. Gleason.  CT simulation done today.  Plan RTC for start of treatment as directed by physician.

## 2021-06-16 NOTE — PROGRESS NOTES
"Ashli comes back in today to discuss her new diagnosis of a left breast cancer.  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    It is estrogen receptor positive, progesterone receptor positive and HER-2 negative.    She has no family history of breast cancer.      PAST MEDICAL HISTORY:  Past Medical History:   Diagnosis Date     Arthritis      Breast cyst      PONV (postoperative nausea and vomiting)     Nausea     Past Surgical History:   Procedure Laterality Date     APPENDECTOMY       BREAST BIOPSY Left 2018    Procedure: Left Breast Biopsy;  Surgeon: Jaja Sloan MD;  Location: [facility];  Service:      BREAST CYST ASPIRATION Left       SECTION       CHOLECYSTECTOMY         Medications:    Current Outpatient Prescriptions:      simvastatin (ZOCOR) 10 MG tablet, Take 1 tablet by mouth daily., Disp: , Rfl: 3    Allergies:  Allergies   Allergen Reactions     Penicillins Hives       Social History:   reports that she has quit smoking. She has never used smokeless tobacco. She reports that she drinks alcohol. She reports that she does not use illicit drugs.    ROS:  A 12 point comprehensive review of systems was negative except as noted.    Physical Exam  BP (!) 183/146 (Patient Site: Left Arm, Patient Position: Sitting, Cuff Size: Adult Regular)  Pulse 82  Ht 4' 11.25\" (1.505 m)  Wt 144 lb 9.6 oz (65.6 kg)  LMP 10/20/1996  SpO2 96%  Breastfeeding? No  BMI 28.96 kg/m2  General:alert, appears stated age and cooperative  Lungs:clear to auscultation bilaterally  CV:regular rate and rhythm, S1, S2 normal, no murmur, click, rub or gallop  Breasts: Well healing scar in the lower left breast.  No signs of infection.  Lymph Nodes:no axillary nodes palpated  Neuro:Grossly normal      Reviewed her mammograms and ultrasound and pathology.     Impression: " Left Breast Cancer.  Clinically this is T1, N0.  Discussed the surgical options for treatment of breast cancer which generally are a lumpectomy (partial mastectomy) combined with radiation versus a mastectomy.  She would need a reexcision lumpectomy at least.  I did warn her that there could be more DCIS there than we expect but I think trying a lumpectomy is reasonable.  I reinforced to her that this would need to be combined with radiation.  She did not quite hear me the first time and is wondering why she needs radiation.  I explained to her that without radiation the risk of local recurrence is quite high.  Explained that the survival benefit is the same for both.  The difference is in local recurrence risk.  The patient is a Good candidate for a lumpectomy.   Discussed SLN biopsy.  The procedure and rationale were explained.  She was very worried about having any lymph nodes removed.  I told her that this is the standard of care.  Again she was very concerned about it but understands that without the knowledge about the lymph nodes, they would then do more radiation then would otherwise be necessary.  Discussed that at this point we do not know yet whether or not she will need chemotherapy and we may not know until we get all of the results of surgery back.  She will for sure need at least hormone therapy.  She also was very concerned about this.  She wondered why that would be necessary and again I reinforced to her that she has a small cancer but it is an invasive breast cancer and without the hormone therapy there would be some risk of it coming back as a spread.  So unfortunately really had nothing but bad news for her today from her standpoint.  She was not excited about radiation, she is not excited about a lymph node biopsy and she is not excited about hormone therapy.  I reassured her that none of this is anything to be excited about but it is all considered necessary.  Spent some time with her and her  family answering all of her questions as best as I could.      Plan: We'll schedule for a left reexcision lumpectomy with sentinel lymph node biopsy. This is typically an outpatient procedure under local MAC anesthetic.  The risks and benefits of surgery were explained.  Also talked about expected recovery time.

## 2021-06-16 NOTE — PROGRESS NOTES
In for follow-up of her left Reexcision lumpectomy  with sentinel lymph node biopsy.  She is feeling well.  She is having very minimal pain.      Physical exam:  Appears well.  Does not appear in any discomfort.  Breasts: Incisions are healing nicely with no signs of infection.  No swelling.    Pathology: There was no residual tumor in the reexcision.  Her sentinel lymph node is negative.    Impression: Postop visit. Doing well.  She still had a lot of questions today about why she needed any further therapy.  Explained that the radiation will significantly reduce her risk of local recurrence and that despite the fact that it was a low-grade tumor and had a small invasive component, it still an invasive cancer and I think she would benefit from hormone therapy.  After much discussion she is at least agreeable to visiting with a oncologist to discuss this.    Plan: An Oncotype is already been ordered and is pending.  We'll refer her onto [facility] onc[facility] for radiation and she would like to build to do that and would vary but she would like to see [place] [facility] to discuss the hormone therapy.  Follow-up with me in 1 year with bilateral mammograms.

## 2021-06-17 NOTE — PROGRESS NOTES
RADIATION ONCOLOGY WEEKLY TREATMENT VISIT NOTE      Assessment / Impression       1. Malignant neoplasm of lower-inner quadrant of left breast in female, estrogen receptor positive       No matching staging information was found for the patient.   Tolerating radiation therapy well.  All questions and concerns addressed.    Plan:   1. Continue radiation treatment as prescribed.  2. Perform upper extremity ROM exercises daily.    Radiation: Site: L Breast  Stereotactic Radiosurgery: No  Stereotactic Radiosurgery: No  Concurrent Therapy: No  Today's Dose: 1590  Total Dose for Breast: 4240  Today's Fraction/Total Fraction Breast:     Subjective:      HPI: Ashli Garcia is a 67 y.o. female with    1. Malignant neoplasm of lower-inner quadrant of left breast in female, estrogen receptor positive         The following portions of the patient's history were reviewed and updated as appropriate: allergies, current medications, past family history, past medical history, past social history, past surgical history and problem list.    Assessment                  Body Site: Breast                           Site: L Breast  Stereotactic Radiosurgery: No  Concurrent Therapy: No  Today's Dose: 1590  Total Dose for Breast: 4240  Today's Fraction/Total Fraction Breast:   Drainage: 0: Absent                                            Sexuality Alteration                 Emotional Alteration Copin: Effective  Comfort Alteration KPS: 100 % Normal, no complaints  Fatigue (ONS scale) : 0: No Fatigue  Pain Location: denies  Hot Flashes and/or Flushes: 0: None   Nutrition Alteration Anorexia: 0: None  Nausea: 0: None  Vomitin: None  BMI: 29.52  Skin Alteration Skin Sensation: 0: No problem  Skin Reaction: 0: None  AUA Assessment                                  Accompanied by       Objective:     Exam:     Vitals:    18 1215   BP: 160/81   Pulse: 69   Temp: 98.4  F (36.9  C)   TempSrc: Oral   SpO2: 98%  "  Weight: 147 lb 6.4 oz (66.9 kg)   Height: 4' 11.25\" (1.505 m)       Wt Readings from Last 8 Encounters:   04/11/18 147 lb 6.4 oz (66.9 kg)   04/04/18 146 lb 11.2 oz (66.5 kg)   03/23/18 146 lb (66.2 kg)   02/21/18 141 lb (64 kg)   02/09/18 144 lb 9.6 oz (65.6 kg)   01/26/18 143 lb (64.9 kg)   01/04/18 143 lb (64.9 kg)       General: Alert and oriented, in no acute distress  Ashli has very mild Erythema.    Treatment Summary to Date    Aria chart and setup information reviewed    I, Constance Gleason MD personally performed the services described in this documentation, as scribed by Colt Hernandez in my presence, and it is both accurate and complete.    Signed by: Constance Gleason MD, MPH     "

## 2021-06-17 NOTE — PROGRESS NOTES
I met with Ashli to congratulate her on finishing her radiation. She said it went well and she feels good.  I told her she can hang onto my number and let me know if anything comes up and she is needing any resources or support or questions answered.  She agrees.

## 2021-06-17 NOTE — PROGRESS NOTES
RADIATION ONCOLOGY WEEKLY TREATMENT VISIT NOTE      Assessment / Impression       1. Malignant neoplasm of lower-inner quadrant of left breast in female, estrogen receptor positive       No matching staging information was found for the patient.   Patient describing some minor fatigue lately which is tolerable. She also mentions two episodes of light headedness when transitioning from laying to standing, once this morning while getting out of bed which was tolerable and a more severe episode yesterday when getting off the table from radiation. Otherwise patient tolerating radiation therapy well. All questions and concerns addressed.    Plan:   1. Stay well hydrated and increase salt intake if tolerated.  2. Continue radiation treatment as prescribed.    Radiation: Site: L Breast  Stereotactic Radiosurgery: No  Stereotactic Radiosurgery: No  Concurrent Therapy: No  Today's Dose: 2915  Total Dose for Breast: 4240  Today's Fraction/Total Fraction Breast:     Subjective:      HPI: Ashli Garcia is a 67 y.o. female with    1. Malignant neoplasm of lower-inner quadrant of left breast in female, estrogen receptor positive         The following portions of the patient's history were reviewed and updated as appropriate: allergies, current medications, past family history, past medical history, past social history, past surgical history and problem list.    Assessment                Body Site: Breast                           Site: L Breast  Stereotactic Radiosurgery: No  Concurrent Therapy: No  Today's Dose: 2915  Total Dose for Breast: 4240  Today's Fraction/Total Fraction Breast:   Drainage: 0: Absent                                            Sexuality Alteration                 Emotional Alteration Copin: Effective  Comfort Alteration KPS: 100 % Normal, no complaints  Fatigue (ONS scale) : 2: Mild Fatigue  Pain Location: denies  Hot Flashes and/or Flushes: 0: None   Nutrition Alteration  Anorexia: 0: None  Nausea: 0: None  Vomitin: None  Skin Alteration Skin Sensation: 0: No problem  Skin Reaction: 1: Faint erythema or dry desquamation  AUA Assessment                                  Accompanied by       Objective:     Exam:     Vitals:    18 1133   BP: 146/76   Pulse: 73   SpO2: 97%   Weight: 146 lb 14.4 oz (66.6 kg)       Wt Readings from Last 8 Encounters:   18 146 lb 14.4 oz (66.6 kg)   18 147 lb 6.4 oz (66.9 kg)   18 146 lb 11.2 oz (66.5 kg)   18 146 lb (66.2 kg)   18 141 lb (64 kg)   18 144 lb 9.6 oz (65.6 kg)   18 143 lb (64.9 kg)   18 143 lb (64.9 kg)       General: Alert and oriented, in no acute distress  Ashli has mild Erythema.    Treatment Summary to Date    Aria chart and setup information reviewed    I, Constance Gleason MD personally performed the services described in this documentation, as scribed by Colt Hernandez in my presence, and it is both accurate and complete.    Signed by: Constance Gleason MD, MPH

## 2021-06-17 NOTE — PROGRESS NOTES
Radiation Treatment Summary    Patient: Ashli Garcia   MRN: [id number]  : 1950  Care Provider: Constance Gleason  Date of Service: 2018      HISTORY: Ashli Garcia was treated with 3D conformal radiation therapy for left breast cancer using DIBH technique for cardiac sparing.    The patient had a left breast needle core biopsy performed on 2017, diagnosis of papillary carcinoma could not be excluded. 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     She had subsequent left breast re-excision lumpectomy and SLN biopsy performed on 2018, pathology was negative for residual invasive carcinoma with benign oriented surgical margins, pathological stage pN0, SLN 0/1 pos, oncotype 10.      She has consulted with Dr. Bryson at [facility] on 3/21/2018 for further chemotherapy/hormone therapy, plan is for 5 year adjuvant Arimidex therapy following radiation.     SITE TREATED: Left Breast   TOTAL DOSE: 4240 cGy  NUMBER OF FRACTIONS: 16  DATES COMPLETED: 2018-2018  CONCURRENT CHEMOTHERAPY: No  ADJUVANT THERAPY:Yes: Arimidex (Dr. Bryson)    Ashli tolerated the treatment without unexpected side effects.     PLAN: Discharge instructions were given and Ashli knows to call if questions/issues arise. Ashli will be seen in f/u in 4-6 weeks without a scan.       Signed by: Kia Michaud, [facility]

## 2021-06-17 NOTE — PROGRESS NOTES
RADIATION ONCOLOGY WEEKLY TREATMENT VISIT NOTE      Assessment / Impression       1. Malignant neoplasm of lower-inner quadrant of left breast in female, estrogen receptor positive         Describing some mild fatigue, otherwise tolerating radiation therapy well.  All questions and concerns addressed.    Plan:   1. Follow up in 4-6 weeks for routine office visit.  2. Continue to perform upper extremity ROM exercises for next 6 months.    Radiation: Site: L breast  Stereotactic Radiosurgery: No  Stereotactic Radiosurgery: No  Concurrent Therapy: No  Today's Dose: 4240  Total Dose for Breast: 4240  Today's Fraction/Total Fraction Breast:     Subjective:      HPI: Ashli Garcia is a 67 y.o. female with    1. Malignant neoplasm of lower-inner quadrant of left breast in female, estrogen receptor positive         The following portions of the patient's history were reviewed and updated as appropriate: allergies, current medications, past family history, past medical history, past social history, past surgical history and problem list.    Assessment                  Body Site: Breast                           Site: L breast  Stereotactic Radiosurgery: No  Concurrent Therapy: No  Today's Dose: 4240  Total Dose for Breast: 4240  Today's Fraction/Total Fraction Breast:   Drainage: 0: Absent                                            Sexuality Alteration                 Emotional Alteration Copin: Effective  Comfort Alteration KPS: 90% Can perform normal activity, minor signs of disease  Fatigue (ONS scale) : 3: Mild Fatigue (some mild dizziness too)  Pain Location: denies  Hot Flashes and/or Flushes: 0: None   Nutrition Alteration Anorexia: 0: None  Nausea: 0: None  Vomitin: None  Skin Alteration Skin Sensation: 0: No problem  Skin Reaction: 1: Faint erythema or dry desquamation  AUA Assessment                                  Accompanied by       Objective:     Exam:     Vitals:    18  1130   BP: 156/75   Pulse: 74   Temp: 98.1  F (36.7  C)   TempSrc: Oral   SpO2: 96%   Weight: 145 lb 8 oz (66 kg)       Wt Readings from Last 8 Encounters:   04/25/18 145 lb 8 oz (66 kg)   04/18/18 146 lb 14.4 oz (66.6 kg)   04/11/18 147 lb 6.4 oz (66.9 kg)   04/04/18 146 lb 11.2 oz (66.5 kg)   03/23/18 146 lb (66.2 kg)   02/21/18 141 lb (64 kg)   02/09/18 144 lb 9.6 oz (65.6 kg)   01/26/18 143 lb (64.9 kg)       General: Alert and oriented, in no acute distress  Ashli has mild Erythema.    Treatment Summary to Date    Aria chart and setup information reviewed    IConstance MD personally performed the services described in this documentation, as scribed by Colt Hernandez in my presence, and it is both accurate and complete.    Signed by: Constance Gleason MD, MPH

## 2021-06-17 NOTE — PROGRESS NOTES
I met with Ashli today to check in and see how she is doing going through radiation.  She said she is doing ok so far.  She denied questions or needs at this time.  I gave her my card and an explaination of my role.  Support and encouragement provided, invited calls.

## 2021-06-17 NOTE — PROGRESS NOTES
RADIATION ONCOLOGY WEEKLY TREATMENT VISIT NOTE      Assessment / Impression       1. Malignant neoplasm of lower-inner quadrant of left breast in female, estrogen receptor positive       No matching staging information was found for the patient.   Tolerating radiation therapy well.  All questions and concerns addressed.    Plan:   1. Instructed the patient on upper extremity ROM exercises, she will perform these daily.  2. Continue radiation treatment as prescribed.    Radiation: Site: L breast  Stereotactic Radiosurgery: No  Stereotactic Radiosurgery: No  Concurrent Therapy: No (will have hormone therapy post-RT)  Today's Dose: 265  Total Dose for Breast: 4240  Today's Fraction/Total Fraction Breast:     Subjective:      HPI: Ashli Garcia is a 67 y.o. female with    1. Malignant neoplasm of lower-inner quadrant of left breast in female, estrogen receptor positive         The following portions of the patient's history were reviewed and updated as appropriate: allergies, current medications, past family history, past medical history, past social history, past surgical history and problem list.    Assessment                  Body Site: Breast                           Site: L breast  Stereotactic Radiosurgery: No  Concurrent Therapy: No (will have hormone therapy post-RT)  Today's Dose: 265  Total Dose for Breast: 4240  Today's Fraction/Total Fraction Breast:   Drainage: 0: Absent                                            Sexuality Alteration                 Emotional Alteration Copin: Effective  Comfort Alteration KPS: 100 % Normal, no complaints  Fatigue (ONS scale) : 0: No Fatigue  Pain Location: denies  Hot Flashes and/or Flushes: 0: None   Nutrition Alteration Anorexia: 0: None  Nausea: 0: None  Vomitin: None  Skin Alteration Skin Sensation: 0: No problem  Skin Reaction: 0: None ([facility] given with written/verbal instructions)  AUA Assessment                                   Accompanied by       Objective:     Exam:     Vitals:    04/04/18 1256   BP: 148/74   Pulse: 75   SpO2: 96%   Weight: 146 lb 11.2 oz (66.5 kg)       Wt Readings from Last 8 Encounters:   04/04/18 146 lb 11.2 oz (66.5 kg)   03/23/18 146 lb (66.2 kg)   02/21/18 141 lb (64 kg)   02/09/18 144 lb 9.6 oz (65.6 kg)   01/26/18 143 lb (64.9 kg)   01/04/18 143 lb (64.9 kg)       General: Alert and oriented, in no acute distress  Ashli has no Erythema.    Treatment Summary to Date    Aria chart and setup information reviewed    I, Constance Gleason MD personally performed the services described in this documentation, as scribed by Colt Hernandez in my presence, and it is both accurate and complete.    Signed by: Constance Gleason MD, MPH

## 2021-06-18 ENCOUNTER — RECORDS - HEALTHEAST (OUTPATIENT)
Dept: ADMINISTRATIVE | Facility: OTHER | Age: 71
End: 2021-06-18

## 2021-06-18 NOTE — PROGRESS NOTES
I met with Ashli and presented her SCP. I explained the contents, the treatment summary in its' entirety and she responded that she has declined the anastrozole. I pointed out also, that the summary did not include any of her MOH records but that the surveillance guidelines were standard for someone with a history of breast cancer. She expressed understanding. I discussed the adv dir and cautioned her about the witnessed signature. I asked if she could review it as I would like to call her for feedback in a week. I congratulated her on her survivorship and thanked her for her time. Otis

## 2021-06-18 NOTE — PROGRESS NOTES
Here for first f/u post-RT, states her skin is healed. No fatigue. She does have a cold with a runny nose today.

## 2021-06-18 NOTE — PROGRESS NOTES
[facility] Radiation Oncology Follow Up Note    Patient: Ashli Garcia  MRN: [id number]  Date of Service: 06/11/2018    Assessment / Impression     1. Malignant neoplasm of lower-inner quadrant of left breast in female, estrogen receptor positive        No matching staging information was found for the patient.  ECOG Peformance Status  ECOG Performance Status: 0  Distress Assessment Score  Distress Assessment Score: No distress  Body site: Breast     Plan:   1. Continue to moisturize as needed.  2. Perform upper extremity ROM exercises for next 5-6 months.  3. Return to myself PRN.  4. Keep mammogram appointment and further follow up with Dr. Sloan as patient declined hormonal treatment so will not be following with medical oncology.     All questions and concerns answered.    Face to face time  25 minutes with > 75% spent on consultation, education and coordination of care.    Subjective:      HPI: Ashli Garcia is a 67 y.o. female who was treated with 3D conformal radiation therapy for left breast cancer using DIBH technique for cardiac sparing.     The patient had a left breast needle core biopsy performed on 12/22/2017, diagnosis of papillary carcinoma could not be excluded. 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      She had subsequent left breast re-excision lumpectomy and SLN biopsy performed on 2/21/2018, pathology was negative for residual invasive carcinoma with benign oriented surgical margins, pathological stage pN0, SLN 0/1 pos, oncotype 10.       She has consulted with Dr. Bryson at [facility] [place] on 3/21/2018 for further chemotherapy/hormone therapy, plan is for 5 year adjuvant Arimidex therapy following radiation.      SITE TREATED: Left Breast              TOTAL DOSE: 4240 cGy  NUMBER OF  FRACTIONS: 16  DATES COMPLETED: 4/4/2018-4/25/2018  CONCURRENT CHEMOTHERAPY: No  ADJUVANT THERAPY:No: declined. (Dr. Bryson)     Ashli tolerated the treatment without unexpected side effects.     The patient presents today for routine office follow up. She had minor fatigue which has since resolved. Did note some light headedness when transitioning from laying to standing from radiation table at the end of radiation treatment but this has resolved. She is otherwise doing well and has no other complaints. She has elected not to undergo hormone therapy.     Current Outpatient Prescriptions   Medication Sig Dispense Refill     CALCIUM CARBONATE/VITAMIN D3 (CALCIUM 600 + D,3, ORAL) Take 2 tablets by mouth daily.       simvastatin (ZOCOR) 10 MG tablet Take 1 tablet by mouth daily.  3     No current facility-administered medications for this visit.        The following portions of the patient's history were reviewed and updated as appropriate: allergies, current medications, past family history, past medical history, past social history, past surgical history and problem list.    Review of Systems    General  Constitutional (WDL): Exceptions to WDL  Fever: 38.0 - 39.0 degrees C (100.4 - 102.2 degrees F) (recently, has a cold. No fever today)  Weight Loss: to <10% from baseline, intervention not indicated  EENT  Eye Disorder (WDL): All eye disorder elements are within defined limits  Ear Disorder (WDL): All ear disorder elements are within defined limits  Respiratory       Respiratory (WDL): Exceptions to WDL (has a cold currently)  Cough: Mild symptoms, nonprescription intervention indicated  Cardiovascular  Cardiovascular (WDL): All cardiovascular elements are within defined limits  Endocrine     Gastrointestinal  Gastrointestinal (WDL): All gastrointestinal elements are within defined limits  Musculoskeletal  Musculoskeletal and Connetive Tissue Disorders (WDL): All Musculoskeletal and Connetive Tissue Disorder  elements are within defined limits  Integumentary               Integumentary (WDL): All integumentary elements are within defined limits  Neurological  Neurosensory (WDL): All neurosensory elements are within defined limits  Dizziness: None (had dizziness for about 3 weeks post-RT, gone now)  Psychological/Emotional   Patient Coping: Accepting  Hematological/Lymphatic  Lymph (WDL): All lymph disorder elements are within defined limits  Dermatologic     Genitourinary/Reproductive  Genitourinary (WDL): All genitourinary elements are within defined limits  Reproductive     Pain              Currently in Pain: No/denies  AUA Assessment                                  Accompanied by  Accompanied by: Family Member      Objective:     Physical Exam    Vitals:    06/11/18 1117   BP: 141/80   Pulse: 70   Temp: 98.3  F (36.8  C)   TempSrc: Oral   SpO2: 98%   Weight: 136 lb (61.7 kg)     Head: Normocephalic, without obvious abnormality, atraumatic  Neck: symmetrical, trachea midline  Lungs: normal respiratory effort  Breasts:  expected post operative changes, no masses skin changes suggestive of recurrence or infection.   Heart: regular rate and rhythm  Skin: Skin color, texture, turgor normal. No rashes or lesions  Lymph nodes: Cervical, supraclavicular, and axillary nodes normal.  Extremities: No lymphedema, full ROM UE.   Neurologic: Grossly normal  Pysch: affect normal, thought content appropriate.     Recent Labs: No results found for this or any previous visit (from the past 168 hour(s)).    Imaging: Imaging results 30 days: No results found.    I, Constance Gleason MD personally performed the services described in this documentation, as scribed by Colt Hernandez in my presence, and it is both accurate and complete.    Signed by: Constance Gleason MD, MPH

## 2021-06-21 NOTE — PROGRESS NOTES
Chief Complaint   Patient presents with     Follow-up     patient states she is doing well has some questions and concerns

## 2021-06-21 NOTE — PROGRESS NOTES
"This is a 68 y.o. woman who comes in for  continued follow-up of her left breast cancer.  She is now 10 months  status post left partial mastectomy with radiation.  She is feeling well.  She has no new complaints today.      Please see the chart review for PMH, Meds, allergies, FH and SH.    ROS:  A 12 point comprehensive review of systems was negative except as noted.      Physical Exam:  /80 (Patient Site: Left Arm, Patient Position: Sitting, Cuff Size: Adult Regular)  Pulse 75  Ht 4' 11\" (1.499 m)  Wt 136 lb (61.7 kg)  LMP 10/20/1996  SpO2 99%  Breastfeeding? No  BMI 27.47 kg/m2  General appearance: alert, appears stated age and cooperative  Breasts: There are no palpable masses. There are minimal radiation changes. The scar has healed up well.  Lymph nodes: Cervical, supraclavicular, and axillary nodes normal.  Neurologic: Grossly normal    Data Review: Reviewed her current mammograms. No evidence of disease.      Impression: Personal History of breast cancer. NOD.  Is overall doing very well.  She tolerated her radiation well.  She is opted not to take hormone therapy.  She does follow-up with Dr. Bryson regarding that.      Plan: Follow up with me in 1 year with bilateral mammograms.      "

## 2021-06-25 ENCOUNTER — HOSPITAL ENCOUNTER (OUTPATIENT)
Dept: SURGERY | Facility: CLINIC | Age: 71
Discharge: HOME | End: 2021-06-25
Attending: SPECIALIST

## 2021-06-25 ENCOUNTER — COMMUNICATION - HEALTHEAST (OUTPATIENT)
Dept: SURGERY | Facility: CLINIC | Age: 71
End: 2021-06-25

## 2021-06-25 DIAGNOSIS — D24.1 INTRADUCTAL PAPILLOMA OF BREAST, RIGHT: ICD-10-CM

## 2021-06-25 DIAGNOSIS — Z85.3 PERSONAL HISTORY OF BREAST CANCER: ICD-10-CM

## 2021-06-26 NOTE — PROGRESS NOTES
Ashli is here with her  Gary for a surg consult. She is feeling well.5 min spent in nursing time. Otis RN, OCN, CBCN

## 2021-06-26 NOTE — PROGRESS NOTES
This is a 70 y.o. woman who I'm asked to see by Tatiana Plasencia CNP for evaluation of  right breast lesion.  This was an area of calcifications picked up on mammogram.  She underwent a needle biopsy which shows a papillary lesion.  She has no palpable mass that she can feel.  She has no family history of breast cancer.  The patient however is status post left lumpectomy followed by radiation for left breast cancer.  She is about 3 years out from that.    Past Medical History:  Past Medical History:   Diagnosis Date     Arthritis      Breast cyst      Hx of radiation therapy 2018     Malignant neoplasm of lower-inner quadrant of left breast in female, estrogen receptor positive (H) 2018     PONV (postoperative nausea and vomiting)     Nausea     Past Surgical History:   Procedure Laterality Date     APPENDECTOMY       BREAST BIOPSY Left 2018    Procedure: Left Breast Biopsy;  Surgeon: Jaja Sloan MD;  Location: [facility];  Service:      BREAST CYST ASPIRATION Left       SECTION       CHOLECYSTECTOMY       MA STEREOTACTIC BREAST BIOPSY VACUUM RT Right 6/15/2021     WA MASTECTOMY,PARTIAL,  WITH AXILLARY LYMPHADENECTOMY Left 2018    Procedure: Left Re-excision Lumpectomy; [place] Lymph Node Biopsy;  Surgeon: Jaja Sloan MD;  Location: [facility];  Service: General         Current Outpatient Medications:      simvastatin (ZOCOR) 10 MG tablet, Take 1 tablet by mouth daily., Disp: , Rfl: 3    Allergies   Allergen Reactions     Penicillins Hives       Social History     Tobacco Use     Smoking status: Former Smoker     Smokeless tobacco: Never Used     Tobacco comment: about 20 yrs ago   Substance Use Topics     Alcohol use: Yes     Comment: Occasionally     Drug use: No       A 12 point comprehensive review of systems was negative except as noted.    Physical exam:  Wt 130 lb (59 kg)   LMP 10/20/1996   BMI 25.39 kg/m     General:alert, appears stated age and  cooperative  Breast: No palpable masses in her breast.  Well-healed scar in the left breast.  Axilla: No axillary adenopathy  Lungs: Clear  CV: Regular without murmur      Imaging: Her mammogram and ultrasound were reviewed.    Impression: Papillary lesion of the right  breast.  Further excision is indicated to rule out DCIS or invasive  carcinoma. Risks and benefits of surgery explained and they wish to proceed.  All questions answered.  Did explain that even if the further excision is negative, just having this is an initial diagnosis does put her at higher risk for developing breast cancer in the future.    Plan: Right breast biopsy with wire localization.  Typically an outpatient procedure under local MAC anesthetic.  Follow-up with me after surgery can be as needed if pathology shows anything of concern or if she has any problems with surgery.  Assuming the pathology is negative, she will still need to continue with yearly mammograms.    Medical Decision Making:    Review of external notes as documented above

## 2021-06-28 ENCOUNTER — AMBULATORY - HEALTHEAST (OUTPATIENT)
Dept: SURGERY | Facility: AMBULATORY SURGERY CENTER | Age: 71
End: 2021-06-28

## 2021-06-28 DIAGNOSIS — Z11.59 ENCOUNTER FOR SCREENING FOR OTHER VIRAL DISEASES: ICD-10-CM

## 2021-07-03 NOTE — ADDENDUM NOTE
Addendum Note by Carlene France CMA at 11/1/2018 11:56 AM     Author: Carlene France CMA Service: -- Author Type: Certified Medical Assistant    Filed: 11/1/2018 11:56 AM Date of Service: 11/1/2018 11:56 AM Status: Signed    : Carlene France CMA (Certified Medical Assistant)    Encounter addended by: Carlene France CMA on: 11/1/2018 11:56 AM<BR>     Actions taken: Sign clinical note, Charge Capture section accepted

## 2021-07-05 PROBLEM — D24.1 INTRADUCTAL PAPILLOMA OF BREAST, RIGHT: Status: ACTIVE | Noted: 2021-06-28

## 2021-07-06 ENCOUNTER — AMBULATORY - HEALTHEAST (OUTPATIENT)
Dept: LAB | Facility: CLINIC | Age: 71
End: 2021-07-06

## 2021-07-06 VITALS — WEIGHT: 130 LBS | BODY MASS INDEX: 25.39 KG/M2

## 2021-07-06 DIAGNOSIS — Z11.59 ENCOUNTER FOR SCREENING FOR OTHER VIRAL DISEASES: ICD-10-CM

## 2021-07-07 ENCOUNTER — COMMUNICATION - HEALTHEAST (OUTPATIENT)
Dept: SCHEDULING | Facility: CLINIC | Age: 71
End: 2021-07-07

## 2021-07-07 LAB
SARS-COV-2 PCR COMMENT: NORMAL
SARS-COV-2 RNA SPEC QL NAA+PROBE: NEGATIVE
SARS-COV-2 VIRUS SPECIMEN SOURCE: NORMAL

## 2021-07-07 ASSESSMENT — MIFFLIN-ST. JEOR: SCORE: 1036.18

## 2021-07-07 NOTE — TELEPHONE ENCOUNTER
Spoke with Ashli today and went over her surgery details:    We've received instruction to get you scheduled for right breast biopsy with Dr Jaja Sloan. We have that arranged as follows:     Surgery Date: Friday July 9th  Location: [facility] [address], [address] (3rd floor) [facility]  Arrival Time: 8:45 AM (Unless instructed differently by the pre-op call nurse)     Prep Instructions:     1. Dr. Sloan will do your pre-op physical the day of surgery.    2. PCR-Rated COVID19 testing is required within 4 days of surgery. We have this scheduled for you at the [facility] in [place] on Tuesday July 6th at 8:15 AM. If your test is positive, your surgery will be canceled.     3. Nothing to eat or drink for 8 hours before surgery unless instructed differently by the pre-op nurse.    4. No blood thinners including aspirin for one week prior to surgery. Verify this is safe for you with your primary care doctor before stopping.     5. You will need an adult to drive you home and stay with you 24 hours after surgery.     6. You may have one family member wait in the lobby at the surgery center during your surgery.    7. When you arrive to the surgery center, you will be screened for COVID19 symptoms. If you screen positive, your surgery will need to be postponed for your safety.    8. If the community sees a new surge in COVID19 hospital admissions, your procedure may need to be postponed. We will contact you if this happens.    9. We always encourage you to notify your insurance any time you have something scheduled including surgery. The number is usually right on the back of your insurance card. Please call [facility] Cost of Care at [phone] for the surgeon fees, and [facility] Cost of Care [phone] for facility fees if you need pricing information.     Call our office if you have any questions! Thank you!       Analy LEBLANC  Surgery Scheduler  [facility]   [facility]  Direct line: [phone]   Main Line: [phone]  Direct Fax: [phone]

## 2021-07-09 ENCOUNTER — HOSPITAL ENCOUNTER (OUTPATIENT)
Dept: MAMMOGRAPHY | Facility: CLINIC | Age: 71
Discharge: HOME | End: 2021-07-09
Attending: SPECIALIST

## 2021-07-09 ENCOUNTER — SURGERY - HEALTHEAST (OUTPATIENT)
Dept: SURGERY | Facility: AMBULATORY SURGERY CENTER | Age: 71
End: 2021-07-09

## 2021-07-09 DIAGNOSIS — D24.1 INTRADUCTAL PAPILLOMA OF BREAST, RIGHT: ICD-10-CM

## 2021-07-10 VITALS — HEIGHT: 60 IN | BODY MASS INDEX: 25.52 KG/M2 | WEIGHT: 130 LBS

## 2021-07-13 ENCOUNTER — RECORDS - HEALTHEAST (OUTPATIENT)
Dept: ADMINISTRATIVE | Facility: CLINIC | Age: 71
End: 2021-07-13

## 2021-07-14 ENCOUNTER — LAB REQUISITION (OUTPATIENT)
Dept: LAB | Facility: CLINIC | Age: 71
End: 2021-07-14

## 2021-07-14 PROCEDURE — 88360 TUMOR IMMUNOHISTOCHEM/MANUAL: CPT | Mod: TC | Performed by: PATHOLOGY

## 2021-07-16 ENCOUNTER — OFFICE VISIT (OUTPATIENT)
Dept: SURGERY | Facility: CLINIC | Age: 71
End: 2021-07-16
Payer: MEDICARE

## 2021-07-16 DIAGNOSIS — C50.111 MALIGNANT NEOPLASM OF CENTRAL PORTION OF RIGHT BREAST IN FEMALE, ESTROGEN RECEPTOR POSITIVE (H): Primary | ICD-10-CM

## 2021-07-16 DIAGNOSIS — Z17.0 MALIGNANT NEOPLASM OF CENTRAL PORTION OF RIGHT BREAST IN FEMALE, ESTROGEN RECEPTOR POSITIVE (H): Primary | ICD-10-CM

## 2021-07-16 PROCEDURE — G0463 HOSPITAL OUTPT CLINIC VISIT: HCPCS

## 2021-07-16 PROCEDURE — 99024 POSTOP FOLLOW-UP VISIT: CPT | Performed by: SPECIALIST

## 2021-07-16 NOTE — LETTER
7/16/2021         RE: Ashli Garcia  [address]  [place] [place] [facility] [address]        Dear Colleague,    Thank you for referring your patient, Ashli Garcia, to the [facility] [place]. Please see a copy of my visit note below.    In for follow-up of her right breast biopsy  without sentinel lymph node biopsy.  She is feeling well.  She is having very minimal pain.      Physical exam:  Appears well.  Does not appear in any discomfort.  Breasts: Incisions are healing nicely with no signs of infection.  No swelling.    Pathology: This was a 17 mm area of scattered DCIS with 2 tiny areas of microinvasion.    Impression: Postop visit. Doing well.  Reviewed her pathology with her.  I do not think she needs any further surgery.  1 margin is a little bit close but it is only one very focal area.  I think that she really needs to consider hormone therapy.  She declined doing it after her initial breast cancer surgery 2 years ago.  I told her she should not obsess about the side effects without at least trying the drugs first to see how she does with that.  She is willing to rediscuss it.  She saw Dr. Bryson last time so we will refer her back to Dr. Bryson.  I also told her that we may want to talk to her about radiation also.  She was not happy to hear that but I told her she should just at least consider it.  The tumor is so small that I do not think an Oncotype is indicated.    Plan:  We'll refer her onto Dr. Bryson.  She will be due for mammograms again in November.  If at tumor conference it is recommended that she have a radiation consult that I will make sure that that happens also.  Follow-up with me in 1 year.        Again, thank you for allowing me to participate in the care of your patient.        Sincerely,        Jaja Sloan MD

## 2021-07-16 NOTE — NURSING NOTE
Ashli presents to [facility] for a surgical consult with Dr. Sloan  regarding her newly diagnosed  breast cancer.  This was found on recent surgical excision of a right breast papilloma.  She is accompanied by her  for consult.  RN assessment and EMR update.  Patient given a Breast Cancer Packet, contents reviewed.  She met with Dr. Sloan  see dictation for details of visit. She will plan to meet with med onc and rad onc.  Follow up with Dr. Sloan in one year. Support provided, invited calls.  RN time 15 mins.

## 2021-07-16 NOTE — PROGRESS NOTES
In for follow-up of her right breast biopsy  without sentinel lymph node biopsy.  She is feeling well.  She is having very minimal pain.      Physical exam:  Appears well.  Does not appear in any discomfort.  Breasts: Incisions are healing nicely with no signs of infection.  No swelling.    Pathology: This was a 17 mm area of scattered DCIS with 2 tiny areas of microinvasion.    Impression: Postop visit. Doing well.  Reviewed her pathology with her.  I do not think she needs any further surgery.  1 margin is a little bit close but it is only one very focal area.  I think that she really needs to consider hormone therapy.  She declined doing it after her initial breast cancer surgery 2 years ago.  I told her she should not obsess about the side effects without at least trying the drugs first to see how she does with that.  She is willing to rediscuss it.  She saw Dr. Bryson last time so we will refer her back to Dr. Bryson.  I also told her that we may want to talk to her about radiation also.  She was not happy to hear that but I told her she should just at least consider it.  The tumor is so small that I do not think an Oncotype is indicated.    Plan:  We'll refer her onto Dr. Bryson.  She will be due for mammograms again in November.  If at tumor conference it is recommended that she have a radiation consult that I will make sure that that happens also.  Follow-up with me in 1 year.

## 2021-07-21 ENCOUNTER — RECORDS - HEALTHEAST (OUTPATIENT)
Dept: ADMINISTRATIVE | Facility: CLINIC | Age: 71
End: 2021-07-21

## 2021-07-22 ENCOUNTER — RECORDS - HEALTHEAST (OUTPATIENT)
Dept: SCHEDULING | Facility: CLINIC | Age: 71
End: 2021-07-22

## 2021-07-22 DIAGNOSIS — Z12.31 OTHER SCREENING MAMMOGRAM: ICD-10-CM

## 2021-08-06 ENCOUNTER — TELEPHONE (OUTPATIENT)
Dept: SURGERY | Facility: CLINIC | Age: 71
End: 2021-08-06

## 2021-08-06 NOTE — TELEPHONE ENCOUNTER
Patient called, said she hasn't heard from Dr. Roberta Bryson's office to set up an oncology appointment.  She said Dr. Sloan was going to discuss her case at Breast Conference and she has not yet heard about that discussion. Told her I will connect with Dr. Sloan, who is out of the office right now, and ask her to call patient upon her return.  I contacted Dr. Bryson's  who will call patient directly to set up an appointment.

## 2021-11-08 ENCOUNTER — HOSPITAL ENCOUNTER (OUTPATIENT)
Dept: MAMMOGRAPHY | Facility: CLINIC | Age: 71
Discharge: HOME | End: 2021-11-08
Attending: SPECIALIST | Admitting: SPECIALIST
Payer: MEDICARE

## 2021-11-08 DIAGNOSIS — C50.111 MALIGNANT NEOPLASM OF CENTRAL PORTION OF RIGHT BREAST IN FEMALE, ESTROGEN RECEPTOR POSITIVE (H): ICD-10-CM

## 2021-11-08 DIAGNOSIS — Z17.0 MALIGNANT NEOPLASM OF CENTRAL PORTION OF RIGHT BREAST IN FEMALE, ESTROGEN RECEPTOR POSITIVE (H): ICD-10-CM

## 2021-11-08 PROCEDURE — 77062 BREAST TOMOSYNTHESIS BI: CPT

## 2021-12-13 ENCOUNTER — TRANSFERRED RECORDS (OUTPATIENT)
Dept: HEALTH INFORMATION MANAGEMENT | Facility: CLINIC | Age: 71
End: 2021-12-13
Payer: MEDICARE

## 2022-02-03 ENCOUNTER — LAB REQUISITION (OUTPATIENT)
Dept: LAB | Facility: CLINIC | Age: 72
End: 2022-02-03
Payer: MEDICARE

## 2022-02-03 DIAGNOSIS — R30.0 DYSURIA: ICD-10-CM

## 2022-02-03 PROCEDURE — 87186 SC STD MICRODIL/AGAR DIL: CPT | Mod: ORL | Performed by: STUDENT IN AN ORGANIZED HEALTH CARE EDUCATION/TRAINING PROGRAM

## 2022-02-04 LAB — BACTERIA UR CULT: ABNORMAL

## 2022-02-09 ENCOUNTER — LAB REQUISITION (OUTPATIENT)
Dept: LAB | Facility: CLINIC | Age: 72
End: 2022-02-09
Payer: MEDICARE

## 2022-02-09 DIAGNOSIS — N30.00 ACUTE CYSTITIS WITHOUT HEMATURIA: ICD-10-CM

## 2022-02-09 PROCEDURE — 87086 URINE CULTURE/COLONY COUNT: CPT | Mod: ORL | Performed by: STUDENT IN AN ORGANIZED HEALTH CARE EDUCATION/TRAINING PROGRAM

## 2022-02-10 LAB — BACTERIA UR CULT: NORMAL

## 2022-02-23 ENCOUNTER — LAB REQUISITION (OUTPATIENT)
Dept: LAB | Facility: CLINIC | Age: 72
End: 2022-02-23
Payer: MEDICARE

## 2022-02-23 DIAGNOSIS — R39.9 UNSPECIFIED SYMPTOMS AND SIGNS INVOLVING THE GENITOURINARY SYSTEM: ICD-10-CM

## 2022-02-23 PROCEDURE — 87086 URINE CULTURE/COLONY COUNT: CPT | Mod: ORL | Performed by: STUDENT IN AN ORGANIZED HEALTH CARE EDUCATION/TRAINING PROGRAM

## 2022-02-25 LAB — BACTERIA UR CULT: NORMAL

## 2022-03-21 ENCOUNTER — TRANSFERRED RECORDS (OUTPATIENT)
Dept: HEALTH INFORMATION MANAGEMENT | Facility: CLINIC | Age: 72
End: 2022-03-21
Payer: MEDICARE

## 2022-04-28 ENCOUNTER — LAB REQUISITION (OUTPATIENT)
Dept: LAB | Facility: CLINIC | Age: 72
End: 2022-04-28
Payer: MEDICARE

## 2022-04-28 DIAGNOSIS — E78.2 MIXED HYPERLIPIDEMIA: ICD-10-CM

## 2022-04-28 DIAGNOSIS — M85.852 OTHER SPECIFIED DISORDERS OF BONE DENSITY AND STRUCTURE, LEFT THIGH: ICD-10-CM

## 2022-04-28 LAB
ALBUMIN SERPL-MCNC: 4.4 G/DL (ref 3.5–5)
ALP SERPL-CCNC: 94 U/L (ref 45–120)
ALT SERPL W P-5'-P-CCNC: 21 U/L (ref 0–45)
ANION GAP SERPL CALCULATED.3IONS-SCNC: 18 MMOL/L (ref 5–18)
AST SERPL W P-5'-P-CCNC: 22 U/L (ref 0–40)
BILIRUB SERPL-MCNC: 1.5 MG/DL (ref 0–1)
BUN SERPL-MCNC: 23 MG/DL (ref 8–28)
CALCIUM SERPL-MCNC: 9.7 MG/DL (ref 8.5–10.5)
CHLORIDE BLD-SCNC: 102 MMOL/L (ref 98–107)
CHOLEST SERPL-MCNC: 157 MG/DL
CO2 SERPL-SCNC: 20 MMOL/L (ref 22–31)
CREAT SERPL-MCNC: 0.84 MG/DL (ref 0.6–1.1)
FASTING STATUS PATIENT QL REPORTED: NORMAL
GFR SERPL CREATININE-BSD FRML MDRD: 74 ML/MIN/1.73M2
GLUCOSE BLD-MCNC: 77 MG/DL (ref 70–125)
HDLC SERPL-MCNC: 51 MG/DL
LDLC SERPL CALC-MCNC: 88 MG/DL
POTASSIUM BLD-SCNC: 4.2 MMOL/L (ref 3.5–5)
PROT SERPL-MCNC: 7.5 G/DL (ref 6–8)
SODIUM SERPL-SCNC: 140 MMOL/L (ref 136–145)
TRIGL SERPL-MCNC: 91 MG/DL

## 2022-04-28 PROCEDURE — 82306 VITAMIN D 25 HYDROXY: CPT | Mod: ORL | Performed by: NURSE PRACTITIONER

## 2022-04-28 PROCEDURE — 80061 LIPID PANEL: CPT | Mod: ORL | Performed by: NURSE PRACTITIONER

## 2022-04-28 PROCEDURE — 80053 COMPREHEN METABOLIC PANEL: CPT | Mod: ORL | Performed by: NURSE PRACTITIONER

## 2022-04-29 LAB — DEPRECATED CALCIDIOL+CALCIFEROL SERPL-MC: 49 UG/L (ref 20–75)

## 2022-06-27 ENCOUNTER — TELEPHONE (OUTPATIENT)
Dept: SURGERY | Facility: CLINIC | Age: 72
End: 2022-06-27

## 2022-06-27 NOTE — TELEPHONE ENCOUNTER
Pt left voice message at 0957am today, requesting call back regarding letter she received last week regarding needing a follow up appt with Dr Jaja Sloan, and pt is not sure what this appt is for or if it's necessary.     RN called pt back and reiterated the letter dated 7/16/21 in her chart recommends a one year follow up with Dr Jaja Sloan. Pt requests that Dr Sloan be asked if this appt is truly necessary. Pt reports she saw Dr Bryson today and had breast exam.     Message routed to Jannet Santos RN, to discuss with Dr Sloan on 6/28/22 when Dr Sloan in clinic, and call pt with provider response. Pt states it is okay to leave detailed message on voicemail if she does not answer. Pt's phone number is [phone].    Tika Renner RN, BSN, CBCN

## 2022-07-06 ENCOUNTER — TELEPHONE (OUTPATIENT)
Dept: SURGERY | Facility: CLINIC | Age: 72
End: 2022-07-06

## 2022-07-06 NOTE — TELEPHONE ENCOUNTER
Called patient to follow up with her on her question of whether or not she needs a one year follow up with Dr. Sloan.  Told Ashli, per Dr. Sloan, it is completley up to her if she chooses to follow up with Dr. Sloan.  Patient states she sees Dr. Bryson every 3 mos and she does cool a CBE.  She feels comfortable with this and said she doesn't feel the need to come see Dr. Sloan.  Told patient that is fine and to just call if there is ever anything she is needing.

## 2022-11-14 ENCOUNTER — HOSPITAL ENCOUNTER (OUTPATIENT)
Dept: MAMMOGRAPHY | Facility: CLINIC | Age: 72
Discharge: HOME | End: 2022-11-14
Attending: NURSE PRACTITIONER | Admitting: NURSE PRACTITIONER
Payer: MEDICARE

## 2022-11-14 DIAGNOSIS — Z12.31 VISIT FOR SCREENING MAMMOGRAM: ICD-10-CM

## 2022-11-14 PROCEDURE — 77067 SCR MAMMO BI INCL CAD: CPT

## 2023-04-27 ENCOUNTER — LAB REQUISITION (OUTPATIENT)
Dept: LAB | Facility: CLINIC | Age: 73
End: 2023-04-27
Payer: MEDICARE

## 2023-04-27 DIAGNOSIS — E78.2 MIXED HYPERLIPIDEMIA: ICD-10-CM

## 2023-04-27 PROCEDURE — 80053 COMPREHEN METABOLIC PANEL: CPT | Mod: ORL | Performed by: NURSE PRACTITIONER

## 2023-04-27 PROCEDURE — 80061 LIPID PANEL: CPT | Mod: ORL | Performed by: NURSE PRACTITIONER

## 2023-04-28 LAB
ALBUMIN SERPL BCG-MCNC: 4.7 G/DL (ref 3.5–5.2)
ALP SERPL-CCNC: 104 U/L (ref 35–104)
ALT SERPL W P-5'-P-CCNC: 29 U/L (ref 10–35)
ANION GAP SERPL CALCULATED.3IONS-SCNC: 19 MMOL/L (ref 7–15)
AST SERPL W P-5'-P-CCNC: 28 U/L (ref 10–35)
BILIRUB SERPL-MCNC: 1.3 MG/DL
BUN SERPL-MCNC: 20.2 MG/DL (ref 8–23)
CALCIUM SERPL-MCNC: 10.1 MG/DL (ref 8.8–10.2)
CHLORIDE SERPL-SCNC: 99 MMOL/L (ref 98–107)
CHOLEST SERPL-MCNC: 140 MG/DL
CREAT SERPL-MCNC: 0.93 MG/DL (ref 0.51–0.95)
DEPRECATED HCO3 PLAS-SCNC: 22 MMOL/L (ref 22–29)
GFR SERPL CREATININE-BSD FRML MDRD: 65 ML/MIN/1.73M2
GLUCOSE SERPL-MCNC: 73 MG/DL (ref 70–99)
HDLC SERPL-MCNC: 48 MG/DL
LDLC SERPL CALC-MCNC: 71 MG/DL
NONHDLC SERPL-MCNC: 92 MG/DL
POTASSIUM SERPL-SCNC: 4.3 MMOL/L (ref 3.4–5.3)
PROT SERPL-MCNC: 7.4 G/DL (ref 6.4–8.3)
SODIUM SERPL-SCNC: 140 MMOL/L (ref 136–145)
TRIGL SERPL-MCNC: 106 MG/DL

## 2023-11-15 ENCOUNTER — HOSPITAL ENCOUNTER (OUTPATIENT)
Dept: MAMMOGRAPHY | Facility: CLINIC | Age: 73
Discharge: HOME | End: 2023-11-15
Attending: NURSE PRACTITIONER | Admitting: NURSE PRACTITIONER
Payer: MEDICARE

## 2023-11-15 DIAGNOSIS — Z12.31 VISIT FOR SCREENING MAMMOGRAM: ICD-10-CM

## 2023-11-15 PROCEDURE — 77067 SCR MAMMO BI INCL CAD: CPT

## 2024-04-29 ENCOUNTER — LAB REQUISITION (OUTPATIENT)
Dept: LAB | Facility: CLINIC | Age: 74
End: 2024-04-29
Payer: MEDICARE

## 2024-04-29 DIAGNOSIS — E78.2 MIXED HYPERLIPIDEMIA: ICD-10-CM

## 2024-04-29 LAB
ALBUMIN SERPL BCG-MCNC: 4.6 G/DL (ref 3.5–5.2)
ALP SERPL-CCNC: 106 U/L (ref 40–150)
ALT SERPL W P-5'-P-CCNC: 19 U/L (ref 0–50)
ANION GAP SERPL CALCULATED.3IONS-SCNC: 14 MMOL/L (ref 7–15)
AST SERPL W P-5'-P-CCNC: 20 U/L (ref 0–45)
BILIRUB SERPL-MCNC: 0.9 MG/DL
BUN SERPL-MCNC: 18.7 MG/DL (ref 8–23)
CALCIUM SERPL-MCNC: 9.8 MG/DL (ref 8.8–10.2)
CHLORIDE SERPL-SCNC: 104 MMOL/L (ref 98–107)
CHOLEST SERPL-MCNC: 149 MG/DL
CREAT SERPL-MCNC: 0.82 MG/DL (ref 0.51–0.95)
DEPRECATED HCO3 PLAS-SCNC: 21 MMOL/L (ref 22–29)
EGFRCR SERPLBLD CKD-EPI 2021: 76 ML/MIN/1.73M2
FASTING STATUS PATIENT QL REPORTED: ABNORMAL
GLUCOSE SERPL-MCNC: 94 MG/DL (ref 70–99)
HDLC SERPL-MCNC: 48 MG/DL
LDLC SERPL CALC-MCNC: 80 MG/DL
NONHDLC SERPL-MCNC: 101 MG/DL
POTASSIUM SERPL-SCNC: 4.1 MMOL/L (ref 3.4–5.3)
PROT SERPL-MCNC: 7.8 G/DL (ref 6.4–8.3)
SODIUM SERPL-SCNC: 139 MMOL/L (ref 135–145)
TRIGL SERPL-MCNC: 107 MG/DL

## 2024-04-29 PROCEDURE — 80061 LIPID PANEL: CPT | Mod: ORL | Performed by: NURSE PRACTITIONER

## 2024-04-29 PROCEDURE — 80053 COMPREHEN METABOLIC PANEL: CPT | Mod: ORL | Performed by: NURSE PRACTITIONER

## 2024-11-18 ENCOUNTER — HOSPITAL ENCOUNTER (OUTPATIENT)
Dept: MAMMOGRAPHY | Facility: CLINIC | Age: 74
Discharge: HOME | End: 2024-11-18
Attending: NURSE PRACTITIONER | Admitting: NURSE PRACTITIONER
Payer: MEDICARE

## 2024-11-18 DIAGNOSIS — Z12.31 VISIT FOR SCREENING MAMMOGRAM: ICD-10-CM

## 2024-11-18 PROCEDURE — 77067 SCR MAMMO BI INCL CAD: CPT

## 2024-11-18 PROCEDURE — 77063 BREAST TOMOSYNTHESIS BI: CPT

## 2025-04-29 ENCOUNTER — LAB REQUISITION (OUTPATIENT)
Dept: LAB | Facility: CLINIC | Age: 75
End: 2025-04-29
Payer: MEDICARE

## 2025-04-29 DIAGNOSIS — E78.2 MIXED HYPERLIPIDEMIA: ICD-10-CM

## 2025-04-29 LAB
CHOLEST SERPL-MCNC: 181 MG/DL
FASTING STATUS PATIENT QL REPORTED: YES
HDLC SERPL-MCNC: 49 MG/DL
LDLC SERPL CALC-MCNC: 106 MG/DL
NONHDLC SERPL-MCNC: 132 MG/DL
TRIGL SERPL-MCNC: 129 MG/DL